# Patient Record
Sex: FEMALE | URBAN - METROPOLITAN AREA
[De-identification: names, ages, dates, MRNs, and addresses within clinical notes are randomized per-mention and may not be internally consistent; named-entity substitution may affect disease eponyms.]

---

## 2023-07-24 ENCOUNTER — OFFICE VISIT (OUTPATIENT)
Dept: OBGYN CLINIC | Age: 38
End: 2023-07-24
Payer: OTHER GOVERNMENT

## 2023-07-24 ENCOUNTER — TELEPHONE (OUTPATIENT)
Dept: OBGYN CLINIC | Age: 38
End: 2023-07-24

## 2023-07-24 VITALS
WEIGHT: 168 LBS | DIASTOLIC BLOOD PRESSURE: 64 MMHG | SYSTOLIC BLOOD PRESSURE: 118 MMHG | BODY MASS INDEX: 24.88 KG/M2 | HEIGHT: 69 IN

## 2023-07-24 DIAGNOSIS — N92.0 MENORRHAGIA WITH REGULAR CYCLE: Primary | ICD-10-CM

## 2023-07-24 DIAGNOSIS — D50.0 CHRONIC BLOOD LOSS ANEMIA: ICD-10-CM

## 2023-07-24 DIAGNOSIS — D25.1 INTRAMURAL, SUBMUCOUS, AND SUBSEROUS LEIOMYOMA OF UTERUS: ICD-10-CM

## 2023-07-24 DIAGNOSIS — N80.129 ENDOMETRIOMA: ICD-10-CM

## 2023-07-24 DIAGNOSIS — N94.6 DYSMENORRHEA: ICD-10-CM

## 2023-07-24 DIAGNOSIS — D25.2 INTRAMURAL, SUBMUCOUS, AND SUBSEROUS LEIOMYOMA OF UTERUS: ICD-10-CM

## 2023-07-24 DIAGNOSIS — Z12.4 PAP SMEAR FOR CERVICAL CANCER SCREENING: ICD-10-CM

## 2023-07-24 DIAGNOSIS — D25.0 INTRAMURAL, SUBMUCOUS, AND SUBSEROUS LEIOMYOMA OF UTERUS: ICD-10-CM

## 2023-07-24 PROCEDURE — 99204 OFFICE O/P NEW MOD 45 MIN: CPT | Performed by: OBSTETRICS & GYNECOLOGY

## 2023-07-24 RX ORDER — OMEPRAZOLE 40 MG/1
CAPSULE, DELAYED RELEASE ORAL
COMMUNITY
Start: 2023-07-19

## 2023-07-24 RX ORDER — SERTRALINE HYDROCHLORIDE 25 MG/1
12.5 TABLET, FILM COATED ORAL DAILY
COMMUNITY

## 2023-07-24 ASSESSMENT — PATIENT HEALTH QUESTIONNAIRE - PHQ9
SUM OF ALL RESPONSES TO PHQ QUESTIONS 1-9: 1
2. FEELING DOWN, DEPRESSED OR HOPELESS: 0
SUM OF ALL RESPONSES TO PHQ QUESTIONS 1-9: 1
1. LITTLE INTEREST OR PLEASURE IN DOING THINGS: 1
SUM OF ALL RESPONSES TO PHQ9 QUESTIONS 1 & 2: 1

## 2023-07-24 NOTE — PROGRESS NOTES
Patient here for discussion of hysterectomy. Patient states her Kristie gave approval for hysterectomy. LAST PAP:  2021, in [facility] clinic in [place]. Patient denies abnormal pap smears. Sexually active: yes-same sex partner      LAST MAMMO:  never     LMP:  Patient's last menstrual period was 07/04/2023 (exact date).     BIRTH CONTROL:  none    TOBACCO USE:  No    FAMILY HISTORY OF:   Breast Cancer:  no   Ovarian Cancer:  No   Uterine Cancer:  No   Colon Cancer:  No    Vitals:    07/24/23 0848   BP: 118/64   Site: Right Upper Arm   Position: Sitting   Weight: 168 lb (76.2 kg)   Height: 5' 9\" (1.753 m)        Tiff Young RN  07/24/23  9:05 AM
Uterus: slightly enlarged by palpation C/W know fibroid    Msk:   no deformity or scoliosis noted with normal posture and gait     Skin:   intact without lesions or rashes     Psych:  alert and cooperative; normal mood and affect; normal attention span and concentration        Aaron Casillas MD   9:52 AM  07/24/23

## 2023-07-24 NOTE — TELEPHONE ENCOUNTER
Name: Torsten Harper     : 1985    Physicians Information    Recommended Surgery: Dx lap, poss left ovarian cystectomy, poss LSO, any other indicated procedures  CPT Code:   87910, 20345  Place:[facility]  When:  Diagnosis:   Diagnosis Orders   1. Menorrhagia with regular cycle        2. Endometrioma        3.  Dysmenorrhea          Time Needed:  Surgeon:Phoenix Fritz MD  Assistant Needed:  Special Request:    Surgery Department Information    Date Scheduled: _____________________ Hospital Pre-Op:______________________        Time Scheduled : ____________________ Surgery Entered: _____________________    Facility: ____________________________ Patient Notified: ______________________    BSO Pre-Op: _______________________ Orders Completed: ___________________

## 2023-07-24 NOTE — PATIENT INSTRUCTIONS
We will call you when your surgery is scheduled  You will get a phone call from the hospital to talk about your medical history and any other issues. They will tell you when and where to be that morning. Please do NOT eat or drink anything after midnight the night before you surgery  If anything changes or you have any questions before your procedure, please call us  I will see you the morning of your surgery!

## 2023-07-24 NOTE — ASSESSMENT & PLAN NOTE
All previous notes, labs and/or imaging performed by [facility] were reviewed and confirmed with pt today. I interpreted these results and D/W pt my opinion and recommendations accordingly. D/W pt findings and treatment options:  OCPs (pt refuses due to CA risk), Myfembree (to help possibly reduce fibroid/endometrioma burden), Sonata and hyst (D/W her concern for prob ERNIE due to fibroids/endometrioma)  After LENGTHY discussion, pt hesitant to try Myfembree due to poss SE's. She would like to proceed with Dx lap and tx of endo, poss LSO for initial treatment    PLAN:  Dx lap, poss left ovarian cystectomy, poss LSO, any other indicated procedures  D/W pt at length risks/benefits of procedure including but not limited to death, bleeding, infection, possible treatment failure and damage to other internal organs. She exhibited full understanding and wishes to proceed.

## 2023-07-29 LAB
CYTOLOGIST CVX/VAG CYTO: NORMAL
CYTOLOGY CVX/VAG DOC THIN PREP: NORMAL
HPV APTIMA: NEGATIVE
HPV GENOTYPE REFLEX: NORMAL
Lab: NORMAL
PATH REPORT.FINAL DX SPEC: NORMAL
STAT OF ADQ CVX/VAG CYTO-IMP: NORMAL

## 2023-08-02 ENCOUNTER — PREP FOR PROCEDURE (OUTPATIENT)
Dept: OBGYN CLINIC | Age: 38
End: 2023-08-02

## 2023-08-23 PROBLEM — Z12.4 PAP SMEAR FOR CERVICAL CANCER SCREENING: Status: RESOLVED | Noted: 2023-07-24 | Resolved: 2023-08-23

## 2023-11-08 ENCOUNTER — OFFICE VISIT (OUTPATIENT)
Dept: OBGYN CLINIC | Age: 38
End: 2023-11-08
Payer: OTHER GOVERNMENT

## 2023-11-08 VITALS
BODY MASS INDEX: 24.88 KG/M2 | SYSTOLIC BLOOD PRESSURE: 120 MMHG | DIASTOLIC BLOOD PRESSURE: 76 MMHG | WEIGHT: 168 LBS | HEIGHT: 69 IN

## 2023-11-08 DIAGNOSIS — D25.2 INTRAMURAL, SUBMUCOUS, AND SUBSEROUS LEIOMYOMA OF UTERUS: Primary | ICD-10-CM

## 2023-11-08 DIAGNOSIS — D25.1 INTRAMURAL, SUBMUCOUS, AND SUBSEROUS LEIOMYOMA OF UTERUS: Primary | ICD-10-CM

## 2023-11-08 DIAGNOSIS — N94.6 DYSMENORRHEA: ICD-10-CM

## 2023-11-08 DIAGNOSIS — N92.0 MENORRHAGIA WITH REGULAR CYCLE: ICD-10-CM

## 2023-11-08 DIAGNOSIS — N80.129 ENDOMETRIOMA: ICD-10-CM

## 2023-11-08 DIAGNOSIS — D25.0 INTRAMURAL, SUBMUCOUS, AND SUBSEROUS LEIOMYOMA OF UTERUS: Primary | ICD-10-CM

## 2023-11-08 PROCEDURE — 99214 OFFICE O/P EST MOD 30 MIN: CPT | Performed by: OBSTETRICS & GYNECOLOGY

## 2023-11-08 SDOH — ECONOMIC STABILITY: FOOD INSECURITY: WITHIN THE PAST 12 MONTHS, THE FOOD YOU BOUGHT JUST DIDN'T LAST AND YOU DIDN'T HAVE MONEY TO GET MORE.: NEVER TRUE

## 2023-11-08 SDOH — ECONOMIC STABILITY: HOUSING INSECURITY
IN THE LAST 12 MONTHS, WAS THERE A TIME WHEN YOU DID NOT HAVE A STEADY PLACE TO SLEEP OR SLEPT IN A SHELTER (INCLUDING NOW)?: NO

## 2023-11-08 SDOH — ECONOMIC STABILITY: INCOME INSECURITY: HOW HARD IS IT FOR YOU TO PAY FOR THE VERY BASICS LIKE FOOD, HOUSING, MEDICAL CARE, AND HEATING?: NOT HARD AT ALL

## 2023-11-08 SDOH — ECONOMIC STABILITY: FOOD INSECURITY: WITHIN THE PAST 12 MONTHS, YOU WORRIED THAT YOUR FOOD WOULD RUN OUT BEFORE YOU GOT MONEY TO BUY MORE.: NEVER TRUE

## 2023-11-08 NOTE — PATIENT INSTRUCTIONS
Your surgery is scheduled for November 15th. You will get a phone call from the hospital to talk about your medical history and any other issues. They will tell you when and where to be that morning. Please do NOT eat or drink anything after midnight the night before you surgery  If anything changes or you have any questions before your procedure, please call us  I will see you the morning of your surgery!

## 2023-11-08 NOTE — ASSESSMENT & PLAN NOTE
After LENGTHY discussion, pt hesitant to try Myfembree due to poss SE's. She would like to proceed with Dx lap and tx of endo, poss LSO for initial treatment    PLAN:  Dx lap, poss left ovarian cystectomy, poss LSO, any other indicated procedures  D/W pt at length risks/benefits of procedure including but not limited to death, bleeding, infection, possible treatment failure and damage to other internal organs.  She exhibited full understanding and wishes to proceed

## 2023-11-08 NOTE — PROGRESS NOTES
Patient here for pre-op appointment. DX Lap. scheduled for 11/15/23. LAST PAP:  7/24/2023, neg., HPV neg. LAST MAMMO:  never     LMP:  Patient's last menstrual period was 10/19/2023 (exact date).     BIRTH CONTROL:  none-same sex partner     TOBACCO USE:  No    FAMILY HISTORY OF:   Breast Cancer:  No   Ovarian Cancer:  No   Uterine Cancer:  No   Colon Cancer:  No    Vitals:    11/08/23 0859   BP: 136/82   Site: Left Upper Arm   Position: Supine   Weight: 76.2 kg (168 lb)   Height: 1.753 m (5' 9\")        Jerome Antonio RN  11/08/23  9:06 AM

## 2023-11-08 NOTE — PROGRESS NOTES
Junaid Arenas, [address], [address] [facility]  [phone]    Bethany Phipps MD, Megan Garcia, [facility]  Mark Holden MD, FACOG    Assessment/Plan     Patient Active Problem List    Diagnosis Date Noted    Dysmenorrhea 07/24/2023     Overview Note:     noted       Assessment & Plan Note:     See overview and A&P under fibroids      Intramural, submucous, and subserous leiomyoma of uterus 07/24/2023     Overview Note:     MRI from [place]:  Uterus 9.4 x 6.9 x 8.3 cm with anterior fibroid 6.0 x 5.3 cm abutting cavity, small fundal fibroid 2.2 cm. Multiple suspected endometriomas with left large one 7.2 x 5.4 cm, right one 2.1 x 2.1 cm  D/W pt findings and treatment options:  OCPs (pt refuses due to CA risk), Myfembree (to help possibly reduce fibroid/endometrioma burden), Sonata and hyst (D/W her concern for prob ERNIE due to fibroids/endometrioma)  After LENGTHY discussion, pt hesitant to try Myfembree due to poss SE's. She would like to proceed with Dx lap and tx of endo, poss LSO for initial treatment    PLAN:  Dx lap, poss left ovarian cystectomy, poss LSO, any other indicated procedures  D/W pt at length risks/benefits of procedure including but not limited to death, bleeding, infection, possible treatment failure and damage to other internal organs. She exhibited full understanding and wishes to proceed       Assessment & Plan Note:     After LENGTHY discussion, pt hesitant to try Myfembree due to poss SE's. She would like to proceed with Dx lap and tx of endo, poss LSO for initial treatment    PLAN:  Dx lap, poss left ovarian cystectomy, poss LSO, any other indicated procedures  D/W pt at length risks/benefits of procedure including but not limited to death, bleeding, infection, possible treatment failure and damage to other internal organs.  She exhibited full understanding and wishes to proceed        Menorrhagia with regular cycle 07/24/2023     Overview Note:     noted

## 2023-11-10 NOTE — PROGRESS NOTES
Patient verified name and . Order for consent not found in EHR; patient verifies procedure. Type 2 surgery, Phone assessment complete. Orders not received. Labs per surgeon: none  Labs per anesthesia protocol: none    HGB 7.7 on 10/19/23. Received Iron infusion 10/20/23 and 10/27/23. Will have anesthesia review chart. Patient answered medical/surgical history questions at their best of ability. All prior to admission medications documented in EPIC. Patient instructed to take the following medications the day of surgery according to anesthesia guidelines with a small sip of water: Prilosec, Zoloft. Hold all vitamins 7 days prior to surgery and NSAIDS 5 days prior to surgery. Prescription meds to hold:none  Patient instructed on the following:    > Arrive at [address] [place] [address], time of arrival to be called the day before by 1700  > NPO after midnight, unless otherwise indicated, including gum, mints, and ice chips  > Responsible adult must drive patient to the hospital, stay during surgery, and patient will need supervision 24 hours after anesthesia  > Use antibacterial soap in shower the night before surgery and on the morning of surgery  > All piercings must be removed prior to arrival.    > Leave all valuables (money and jewelry) at home but bring insurance card and ID on DOS.   > Do not wear make-up, nail polish, lotions, cologne, perfumes, powders, or oil on skin. Artificial nails are not permitted.

## 2023-11-10 NOTE — H&P
nausea/vomiting/diarrhea/constipation    Neuro: No HA, no seizure like activity    Skin: No rashes or lesions     : No dysuria or hematuria        Objective    Vitals:    11/10/23 0922   Weight: 76.2 kg (168 lb)   Height: 1.753 m (5' 9\")          Physical Exam    Gen: alert and cooperative, NAD    HEENT: NCAT    CV: RRR    Resp: CTA bilat    Abd: soft, NT, NABS    EXT: trace edema bilat    Gyn: done as per prev office visit    Neuro: No focal deficits    Skin: No noted rashes or lesions       Bethany Phipps MD  9:38 AM  11/10/23

## 2023-11-13 NOTE — PROGRESS NOTES
Dr. Deyanira Banks reviewed chart. New order received \"ask Dr. Demarcus Snider if he wants repeat before DOS. \" [address] [facility] to proceed. Message sent to Dr. Demarcus Snider.

## 2023-11-14 ENCOUNTER — HOSPITAL ENCOUNTER (OUTPATIENT)
Dept: LAB | Age: 38
Discharge: HOME | End: 2023-11-17
Payer: OTHER GOVERNMENT

## 2023-11-14 ENCOUNTER — ANESTHESIA EVENT (OUTPATIENT)
Dept: SURGERY | Age: 38
End: 2023-11-14
Payer: OTHER GOVERNMENT

## 2023-11-14 DIAGNOSIS — Z01.818 PRE-OP TESTING: ICD-10-CM

## 2023-11-14 LAB — HGB BLD-MCNC: 11.7 G/DL (ref 11.7–15.4)

## 2023-11-14 PROCEDURE — 36415 COLL VENOUS BLD VENIPUNCTURE: CPT

## 2023-11-14 PROCEDURE — 85018 HEMOGLOBIN: CPT

## 2023-11-15 ENCOUNTER — HOSPITAL ENCOUNTER (OUTPATIENT)
Age: 38
Setting detail: OUTPATIENT SURGERY
Discharge: HOME | End: 2023-11-15
Attending: OBSTETRICS & GYNECOLOGY | Admitting: OBSTETRICS & GYNECOLOGY
Payer: OTHER GOVERNMENT

## 2023-11-15 ENCOUNTER — ANESTHESIA (OUTPATIENT)
Dept: SURGERY | Age: 38
End: 2023-11-15
Payer: OTHER GOVERNMENT

## 2023-11-15 VITALS
RESPIRATION RATE: 18 BRPM | SYSTOLIC BLOOD PRESSURE: 126 MMHG | HEART RATE: 60 BPM | OXYGEN SATURATION: 100 % | DIASTOLIC BLOOD PRESSURE: 72 MMHG | WEIGHT: 168.2 LBS | BODY MASS INDEX: 24.91 KG/M2 | TEMPERATURE: 98 F | HEIGHT: 69 IN

## 2023-11-15 DIAGNOSIS — N80.129 ENDOMETRIOMA: ICD-10-CM

## 2023-11-15 DIAGNOSIS — Z01.818 PRE-OP TESTING: Primary | ICD-10-CM

## 2023-11-15 DIAGNOSIS — D25.2 INTRAMURAL, SUBMUCOUS, AND SUBSEROUS LEIOMYOMA OF UTERUS: ICD-10-CM

## 2023-11-15 DIAGNOSIS — D25.0 INTRAMURAL, SUBMUCOUS, AND SUBSEROUS LEIOMYOMA OF UTERUS: ICD-10-CM

## 2023-11-15 DIAGNOSIS — N92.0 MENORRHAGIA WITH REGULAR CYCLE: ICD-10-CM

## 2023-11-15 DIAGNOSIS — D25.1 INTRAMURAL, SUBMUCOUS, AND SUBSEROUS LEIOMYOMA OF UTERUS: ICD-10-CM

## 2023-11-15 LAB — HCG UR QL: NEGATIVE

## 2023-11-15 PROCEDURE — 6360000002 HC RX W HCPCS: Performed by: ANESTHESIOLOGY

## 2023-11-15 PROCEDURE — 6360000002 HC RX W HCPCS: Performed by: REGISTERED NURSE

## 2023-11-15 PROCEDURE — 7100000000 HC PACU RECOVERY - FIRST 15 MIN: Performed by: OBSTETRICS & GYNECOLOGY

## 2023-11-15 PROCEDURE — 2580000003 HC RX 258: Performed by: ANESTHESIOLOGY

## 2023-11-15 PROCEDURE — 7100000010 HC PHASE II RECOVERY - FIRST 15 MIN: Performed by: OBSTETRICS & GYNECOLOGY

## 2023-11-15 PROCEDURE — 7100000011 HC PHASE II RECOVERY - ADDTL 15 MIN: Performed by: OBSTETRICS & GYNECOLOGY

## 2023-11-15 PROCEDURE — 3600000004 HC SURGERY LEVEL 4 BASE: Performed by: OBSTETRICS & GYNECOLOGY

## 2023-11-15 PROCEDURE — 2500000003 HC RX 250 WO HCPCS: Performed by: ANESTHESIOLOGY

## 2023-11-15 PROCEDURE — 6370000000 HC RX 637 (ALT 250 FOR IP): Performed by: ANESTHESIOLOGY

## 2023-11-15 PROCEDURE — 3700000000 HC ANESTHESIA ATTENDED CARE: Performed by: OBSTETRICS & GYNECOLOGY

## 2023-11-15 PROCEDURE — 2709999900 HC NON-CHARGEABLE SUPPLY: Performed by: OBSTETRICS & GYNECOLOGY

## 2023-11-15 PROCEDURE — 2500000003 HC RX 250 WO HCPCS: Performed by: NURSE ANESTHETIST, CERTIFIED REGISTERED

## 2023-11-15 PROCEDURE — 6360000002 HC RX W HCPCS: Performed by: OBSTETRICS & GYNECOLOGY

## 2023-11-15 PROCEDURE — 3700000001 HC ADD 15 MINUTES (ANESTHESIA): Performed by: OBSTETRICS & GYNECOLOGY

## 2023-11-15 PROCEDURE — 3600000014 HC SURGERY LEVEL 4 ADDTL 15MIN: Performed by: OBSTETRICS & GYNECOLOGY

## 2023-11-15 PROCEDURE — 6360000002 HC RX W HCPCS: Performed by: NURSE ANESTHETIST, CERTIFIED REGISTERED

## 2023-11-15 PROCEDURE — 81025 URINE PREGNANCY TEST: CPT

## 2023-11-15 PROCEDURE — 7100000001 HC PACU RECOVERY - ADDTL 15 MIN: Performed by: OBSTETRICS & GYNECOLOGY

## 2023-11-15 RX ORDER — IBUPROFEN 600 MG/1
600 TABLET ORAL EVERY 6 HOURS PRN
Qty: 60 TABLET | Refills: 1 | Status: SHIPPED | OUTPATIENT
Start: 2023-11-15

## 2023-11-15 RX ORDER — ROCURONIUM BROMIDE 10 MG/ML
INJECTION, SOLUTION INTRAVENOUS PRN
Status: DISCONTINUED | OUTPATIENT
Start: 2023-11-15 | End: 2023-11-15 | Stop reason: SDUPTHER

## 2023-11-15 RX ORDER — OXYCODONE HYDROCHLORIDE AND ACETAMINOPHEN 5; 325 MG/1; MG/1
1 TABLET ORAL EVERY 6 HOURS PRN
Qty: 20 TABLET | Refills: 0 | Status: SHIPPED | OUTPATIENT
Start: 2023-11-15 | End: 2023-11-22

## 2023-11-15 RX ORDER — DEXAMETHASONE SODIUM PHOSPHATE 10 MG/ML
INJECTION INTRAMUSCULAR; INTRAVENOUS PRN
Status: DISCONTINUED | OUTPATIENT
Start: 2023-11-15 | End: 2023-11-15 | Stop reason: SDUPTHER

## 2023-11-15 RX ORDER — SODIUM CHLORIDE 9 MG/ML
INJECTION, SOLUTION INTRAVENOUS PRN
Status: DISCONTINUED | OUTPATIENT
Start: 2023-11-15 | End: 2023-11-15 | Stop reason: HOSPADM

## 2023-11-15 RX ORDER — FENTANYL CITRATE 50 UG/ML
25 INJECTION, SOLUTION INTRAMUSCULAR; INTRAVENOUS
Status: DISCONTINUED | OUTPATIENT
Start: 2023-11-15 | End: 2023-11-15 | Stop reason: HOSPADM

## 2023-11-15 RX ORDER — KETOROLAC TROMETHAMINE 30 MG/ML
INJECTION, SOLUTION INTRAMUSCULAR; INTRAVENOUS PRN
Status: DISCONTINUED | OUTPATIENT
Start: 2023-11-15 | End: 2023-11-15 | Stop reason: SDUPTHER

## 2023-11-15 RX ORDER — PROPOFOL 10 MG/ML
INJECTION, EMULSION INTRAVENOUS PRN
Status: DISCONTINUED | OUTPATIENT
Start: 2023-11-15 | End: 2023-11-15 | Stop reason: SDUPTHER

## 2023-11-15 RX ORDER — OXYCODONE HYDROCHLORIDE 5 MG/1
5 TABLET ORAL PRN
Status: COMPLETED | OUTPATIENT
Start: 2023-11-15 | End: 2023-11-15

## 2023-11-15 RX ORDER — SODIUM CHLORIDE 9 MG/ML
INJECTION, SOLUTION INTRAVENOUS CONTINUOUS
Status: DISCONTINUED | OUTPATIENT
Start: 2023-11-15 | End: 2023-11-15 | Stop reason: HOSPADM

## 2023-11-15 RX ORDER — SODIUM CHLORIDE 0.9 % (FLUSH) 0.9 %
5-40 SYRINGE (ML) INJECTION PRN
Status: DISCONTINUED | OUTPATIENT
Start: 2023-11-15 | End: 2023-11-15 | Stop reason: HOSPADM

## 2023-11-15 RX ORDER — SODIUM CHLORIDE 0.9 % (FLUSH) 0.9 %
5-40 SYRINGE (ML) INJECTION EVERY 12 HOURS SCHEDULED
Status: DISCONTINUED | OUTPATIENT
Start: 2023-11-15 | End: 2023-11-15 | Stop reason: HOSPADM

## 2023-11-15 RX ORDER — GLYCOPYRROLATE 0.2 MG/ML
INJECTION INTRAMUSCULAR; INTRAVENOUS PRN
Status: DISCONTINUED | OUTPATIENT
Start: 2023-11-15 | End: 2023-11-15 | Stop reason: SDUPTHER

## 2023-11-15 RX ORDER — MIDAZOLAM HYDROCHLORIDE 1 MG/ML
INJECTION INTRAMUSCULAR; INTRAVENOUS PRN
Status: DISCONTINUED | OUTPATIENT
Start: 2023-11-15 | End: 2023-11-15 | Stop reason: SDUPTHER

## 2023-11-15 RX ORDER — BUPIVACAINE HYDROCHLORIDE 5 MG/ML
INJECTION, SOLUTION EPIDURAL; INTRACAUDAL PRN
Status: DISCONTINUED | OUTPATIENT
Start: 2023-11-15 | End: 2023-11-15 | Stop reason: ALTCHOICE

## 2023-11-15 RX ORDER — FENTANYL CITRATE 50 UG/ML
100 INJECTION, SOLUTION INTRAMUSCULAR; INTRAVENOUS
Status: DISCONTINUED | OUTPATIENT
Start: 2023-11-15 | End: 2023-11-15 | Stop reason: HOSPADM

## 2023-11-15 RX ORDER — SODIUM CHLORIDE 0.9 % (FLUSH) 0.9 %
5-40 SYRINGE (ML) INJECTION PRN
Status: DISCONTINUED | OUTPATIENT
Start: 2023-11-15 | End: 2023-11-15

## 2023-11-15 RX ORDER — OXYCODONE HYDROCHLORIDE 5 MG/1
10 TABLET ORAL PRN
Status: COMPLETED | OUTPATIENT
Start: 2023-11-15 | End: 2023-11-15

## 2023-11-15 RX ORDER — APREPITANT 40 MG/1
40 CAPSULE ORAL ONCE
Status: COMPLETED | OUTPATIENT
Start: 2023-11-15 | End: 2023-11-15

## 2023-11-15 RX ORDER — SODIUM CHLORIDE 9 MG/ML
INJECTION, SOLUTION INTRAVENOUS PRN
Status: DISCONTINUED | OUTPATIENT
Start: 2023-11-15 | End: 2023-11-15

## 2023-11-15 RX ORDER — NEOSTIGMINE METHYLSULFATE 1 MG/ML
INJECTION, SOLUTION INTRAVENOUS PRN
Status: DISCONTINUED | OUTPATIENT
Start: 2023-11-15 | End: 2023-11-15 | Stop reason: SDUPTHER

## 2023-11-15 RX ORDER — ONDANSETRON 2 MG/ML
INJECTION INTRAMUSCULAR; INTRAVENOUS PRN
Status: DISCONTINUED | OUTPATIENT
Start: 2023-11-15 | End: 2023-11-15 | Stop reason: SDUPTHER

## 2023-11-15 RX ORDER — LIDOCAINE HYDROCHLORIDE 20 MG/ML
INJECTION, SOLUTION EPIDURAL; INFILTRATION; INTRACAUDAL; PERINEURAL PRN
Status: DISCONTINUED | OUTPATIENT
Start: 2023-11-15 | End: 2023-11-15 | Stop reason: SDUPTHER

## 2023-11-15 RX ORDER — SCOLOPAMINE TRANSDERMAL SYSTEM 1 MG/1
1 PATCH, EXTENDED RELEASE TRANSDERMAL ONCE
Status: DISCONTINUED | OUTPATIENT
Start: 2023-11-15 | End: 2023-11-15 | Stop reason: HOSPADM

## 2023-11-15 RX ORDER — SODIUM CHLORIDE, SODIUM LACTATE, POTASSIUM CHLORIDE, CALCIUM CHLORIDE 600; 310; 30; 20 MG/100ML; MG/100ML; MG/100ML; MG/100ML
INJECTION, SOLUTION INTRAVENOUS CONTINUOUS
Status: DISCONTINUED | OUTPATIENT
Start: 2023-11-15 | End: 2023-11-15 | Stop reason: HOSPADM

## 2023-11-15 RX ORDER — ONDANSETRON 2 MG/ML
4 INJECTION INTRAMUSCULAR; INTRAVENOUS
Status: COMPLETED | OUTPATIENT
Start: 2023-11-15 | End: 2023-11-15

## 2023-11-15 RX ORDER — FENTANYL CITRATE 50 UG/ML
INJECTION, SOLUTION INTRAMUSCULAR; INTRAVENOUS PRN
Status: DISCONTINUED | OUTPATIENT
Start: 2023-11-15 | End: 2023-11-15 | Stop reason: SDUPTHER

## 2023-11-15 RX ORDER — ONDANSETRON 4 MG/1
4 TABLET, ORALLY DISINTEGRATING ORAL EVERY 6 HOURS PRN
Qty: 20 TABLET | Refills: 0 | Status: SHIPPED | OUTPATIENT
Start: 2023-11-15

## 2023-11-15 RX ORDER — DIPHENHYDRAMINE HYDROCHLORIDE 50 MG/ML
6.25 INJECTION INTRAMUSCULAR; INTRAVENOUS
Status: DISCONTINUED | OUTPATIENT
Start: 2023-11-15 | End: 2023-11-15 | Stop reason: HOSPADM

## 2023-11-15 RX ORDER — HYDROCODONE BITARTRATE AND ACETAMINOPHEN 5; 325 MG/1; MG/1
1 TABLET ORAL EVERY 6 HOURS PRN
Qty: 18 TABLET | Refills: 0 | Status: SHIPPED | OUTPATIENT
Start: 2023-11-15 | End: 2023-11-20

## 2023-11-15 RX ORDER — MIDAZOLAM HYDROCHLORIDE 2 MG/2ML
2 INJECTION, SOLUTION INTRAMUSCULAR; INTRAVENOUS
Status: DISCONTINUED | OUTPATIENT
Start: 2023-11-15 | End: 2023-11-15 | Stop reason: HOSPADM

## 2023-11-15 RX ORDER — LIDOCAINE HYDROCHLORIDE 10 MG/ML
1 INJECTION, SOLUTION INFILTRATION; PERINEURAL
Status: COMPLETED | OUTPATIENT
Start: 2023-11-15 | End: 2023-11-15

## 2023-11-15 RX ORDER — SODIUM CHLORIDE 0.9 % (FLUSH) 0.9 %
5-40 SYRINGE (ML) INJECTION EVERY 12 HOURS SCHEDULED
Status: DISCONTINUED | OUTPATIENT
Start: 2023-11-15 | End: 2023-11-15

## 2023-11-15 RX ADMIN — HYDROMORPHONE HYDROCHLORIDE 0.5 MG: 1 INJECTION, SOLUTION INTRAMUSCULAR; INTRAVENOUS; SUBCUTANEOUS at 12:34

## 2023-11-15 RX ADMIN — GLYCOPYRROLATE 0.5 MG: 0.2 INJECTION INTRAMUSCULAR; INTRAVENOUS at 12:02

## 2023-11-15 RX ADMIN — PHENYLEPHRINE HYDROCHLORIDE 100 MCG: 0.1 INJECTION, SOLUTION INTRAVENOUS at 11:54

## 2023-11-15 RX ADMIN — ROCURONIUM BROMIDE 40 MG: 10 INJECTION, SOLUTION INTRAVENOUS at 11:16

## 2023-11-15 RX ADMIN — KETOROLAC TROMETHAMINE 30 MG: 30 INJECTION, SOLUTION INTRAMUSCULAR at 12:04

## 2023-11-15 RX ADMIN — PROPOFOL 200 MG: 10 INJECTION, EMULSION INTRAVENOUS at 11:16

## 2023-11-15 RX ADMIN — HYDROMORPHONE HYDROCHLORIDE 0.5 MG: 1 INJECTION, SOLUTION INTRAMUSCULAR; INTRAVENOUS; SUBCUTANEOUS at 12:29

## 2023-11-15 RX ADMIN — LIDOCAINE HYDROCHLORIDE 1 ML: 10 INJECTION, SOLUTION INFILTRATION; PERINEURAL at 09:48

## 2023-11-15 RX ADMIN — GLYCOPYRROLATE 0.1 MG: 0.2 INJECTION INTRAMUSCULAR; INTRAVENOUS at 11:29

## 2023-11-15 RX ADMIN — LIDOCAINE HYDROCHLORIDE 80 MG: 20 INJECTION, SOLUTION EPIDURAL; INFILTRATION; INTRACAUDAL; PERINEURAL at 11:15

## 2023-11-15 RX ADMIN — SODIUM CHLORIDE, SODIUM LACTATE, POTASSIUM CHLORIDE, AND CALCIUM CHLORIDE: 600; 310; 30; 20 INJECTION, SOLUTION INTRAVENOUS at 11:31

## 2023-11-15 RX ADMIN — MIDAZOLAM 2 MG: 1 INJECTION INTRAMUSCULAR; INTRAVENOUS at 11:05

## 2023-11-15 RX ADMIN — SODIUM CHLORIDE, SODIUM LACTATE, POTASSIUM CHLORIDE, AND CALCIUM CHLORIDE: 600; 310; 30; 20 INJECTION, SOLUTION INTRAVENOUS at 09:47

## 2023-11-15 RX ADMIN — HYDROMORPHONE HYDROCHLORIDE 0.5 MG: 1 INJECTION, SOLUTION INTRAMUSCULAR; INTRAVENOUS; SUBCUTANEOUS at 12:24

## 2023-11-15 RX ADMIN — ONDANSETRON 4 MG: 2 INJECTION INTRAMUSCULAR; INTRAVENOUS at 12:28

## 2023-11-15 RX ADMIN — Medication 5 MG: at 12:02

## 2023-11-15 RX ADMIN — OXYCODONE HYDROCHLORIDE 10 MG: 5 TABLET ORAL at 12:31

## 2023-11-15 RX ADMIN — ONDANSETRON 4 MG: 2 INJECTION INTRAMUSCULAR; INTRAVENOUS at 11:27

## 2023-11-15 RX ADMIN — DEXAMETHASONE SODIUM PHOSPHATE 7 MG: 10 INJECTION INTRAMUSCULAR; INTRAVENOUS at 11:27

## 2023-11-15 RX ADMIN — FENTANYL CITRATE 100 MCG: 50 INJECTION, SOLUTION INTRAMUSCULAR; INTRAVENOUS at 11:15

## 2023-11-15 RX ADMIN — APREPITANT 40 MG: 40 CAPSULE ORAL at 10:57

## 2023-11-15 ASSESSMENT — PAIN - FUNCTIONAL ASSESSMENT: PAIN_FUNCTIONAL_ASSESSMENT: NONE - DENIES PAIN

## 2023-11-15 ASSESSMENT — PAIN SCALES - GENERAL
PAINLEVEL_OUTOF10: 8
PAINLEVEL_OUTOF10: 10
PAINLEVEL_OUTOF10: 8
PAINLEVEL_OUTOF10: 5

## 2023-11-15 NOTE — INTERVAL H&P NOTE
I have examined and spoken with the patient this morning. She has no new medical issues or complaints. She reports no new medicines since H&P. She again understands procedure, risks/benefits and agrees to proceed.   Barbera Schlatter, MD  10:45 AM  11/15/23

## 2023-11-15 NOTE — ANESTHESIA POSTPROCEDURE EVALUATION
Department of Anesthesiology  Postprocedure Note    Patient: Vik Hicks  MRN: [id number]  YOB: 1985  Date of evaluation: 11/15/2023      Procedure Summary     Date: 11/15/23 Room / Location: [facility]  / [facility]    Anesthesia Start: 1100 Anesthesia Stop: 1224    Procedures:       LAPAROSCOPY DIAGNOSTIC (Abdomen)      DIAGNOSTIC LAPAROSCOPY, DRAINAGE LEFT OVARIAN ENDOMETRIOMA (Left) Diagnosis:       Menorrhagia with regular cycle      Endometrioma      Dysmenorrhea      (Menorrhagia with regular cycle [N92.0])      (Endometrioma [N80.129])      (Dysmenorrhea [N94.6])    Surgeons: Elzbieta Heart MD Responsible Provider: Chhaya Brsyon MD    Anesthesia Type: General ASA Status: 2          Anesthesia Type: General    Jennifer Phase I: Jennifer Score: 10    Jennifer Phase II: Jennifer Score: 10      Anesthesia Post Evaluation    Patient location during evaluation: PACU  Patient participation: complete - patient participated  Level of consciousness: awake  Airway patency: patent  Nausea & Vomiting: no nausea  Complications: no  Cardiovascular status: blood pressure returned to baseline and hemodynamically stable  Respiratory status: acceptable  Hydration status: stable  Multimodal analgesia pain management approach  Pain management: adequate

## 2023-11-15 NOTE — OP NOTE
Junaid Arenas, [address], [address] [place] [address], MD, Gabi Alfonso, WHNP-BC  Letty Swanson MD, FACOG    Yobanyreddleandra Yue Gastonmike  1985      Preop Dx:   1. Menorrhagia with regular    2. Dysmenorrhea    3. Left endometrioma    Postop Dx:   Same     4. Uterine adenomyosis    5. Pelvic peritoneal    Procedure:   1. Diagnostic laparoscopy    2. Laparoscopic drainage of left endometrioma    Surgeon:  Shivam Prasad      Anesthesia:  GETA    EBL:  5 mL     IVF:  1200 mL     UOP:  293 mL    Complications:  None    Findings:  Enlarged uterine fundus C/W known fibroid    Procedure:  Patient was taken to the operating room where GETA anesthesia was found to be adequate. She was prepped and draped in the usual sterile fashion and placed in the dorsal lithotomy position in the [employer] [facility] A weighted speculum was placed in the patient's vagina. Vagina was constricted and cervix stenotic. Sponge stick was placed in vagina. Attention was then turned to the patient's abdomen. After local infiltration with 0.5% marcaine, an infraumbilical skin incision was made with the scalpel. Veress needle was introduced. Intraabdominal placement confirmed with drop in pressure. Pneumopretioneum was then obtained with insufflation of CO2 gas. Bladeless 5 mm trocar was placed intraabdominally under direct visualization with the laparoscope. Survey of the patient's pelvis and abdomen revealed the above findings. A second and third bladeless 5 mm trocars were placed in the LLQ and RLQ under direct visualization with the laparoscope. Findings as above. Left ovary was freed from the pelvic sidewall bluntly. Small opening was made in the left ovary and copious chocolate fluid was released. Pelvis copiously irrigated, cleared of all clots and debris and hemostasis assured throughout. All instruments removed from the patient's abdomen.  The trocar sites were closed with 4-0 monocryl

## 2023-11-15 NOTE — ANESTHESIA PRE PROCEDURE
\"BEART\", \"[id number]\"     Type & Screen (If Applicable):  No results found for: \"LABABO\", \"LABRH\"    Drug/Infectious Status (If Applicable):  No results found for: \"HIV\", \"HEPCAB\"    COVID-19 Screening (If Applicable): No results found for: \"COVID19\"        Anesthesia Evaluation  Patient summary reviewed  Airway: Mallampati: II  TM distance: >3 FB   Neck ROM: full  Mouth opening: > = 3 FB   Dental: normal exam         Pulmonary:normal exam                               Cardiovascular:                      Neuro/Psych:               GI/Hepatic/Renal:             Endo/Other:                     Abdominal:             Vascular: Other Findings:           Anesthesia Plan      general     ASA 2             Anesthetic plan and risks discussed with patient.                         Elvira Egan MD   11/15/2023

## 2023-12-05 ENCOUNTER — OFFICE VISIT (OUTPATIENT)
Dept: OBGYN CLINIC | Age: 38
End: 2023-12-05

## 2023-12-05 ENCOUNTER — TELEPHONE (OUTPATIENT)
Dept: OBGYN CLINIC | Age: 38
End: 2023-12-05

## 2023-12-05 VITALS
SYSTOLIC BLOOD PRESSURE: 120 MMHG | DIASTOLIC BLOOD PRESSURE: 68 MMHG | WEIGHT: 168 LBS | BODY MASS INDEX: 24.88 KG/M2 | HEIGHT: 69 IN

## 2023-12-05 DIAGNOSIS — D25.1 INTRAMURAL, SUBMUCOUS, AND SUBSEROUS LEIOMYOMA OF UTERUS: ICD-10-CM

## 2023-12-05 DIAGNOSIS — N92.0 MENORRHAGIA WITH REGULAR CYCLE: ICD-10-CM

## 2023-12-05 DIAGNOSIS — D25.0 INTRAMURAL, SUBMUCOUS, AND SUBSEROUS LEIOMYOMA OF UTERUS: ICD-10-CM

## 2023-12-05 DIAGNOSIS — D25.2 INTRAMURAL, SUBMUCOUS, AND SUBSEROUS LEIOMYOMA OF UTERUS: ICD-10-CM

## 2023-12-05 DIAGNOSIS — Z09 POSTOPERATIVE EXAMINATION: Primary | ICD-10-CM

## 2023-12-05 DIAGNOSIS — N94.6 DYSMENORRHEA: ICD-10-CM

## 2023-12-05 DIAGNOSIS — N80.9 ENDOMETRIOSIS DETERMINED BY LAPAROSCOPY: ICD-10-CM

## 2023-12-05 PROCEDURE — 99024 POSTOP FOLLOW-UP VISIT: CPT | Performed by: OBSTETRICS & GYNECOLOGY

## 2023-12-05 RX ORDER — RELUGOLIX, ESTRADIOL HEMIHYDRATE, AND NORETHINDRONE ACETATE 40; 1; .5 MG/1; MG/1; MG/1
1 TABLET, FILM COATED ORAL DAILY
Qty: 30 TABLET | Refills: 11 | Status: SHIPPED | OUTPATIENT
Start: 2023-12-05

## 2023-12-05 NOTE — PROGRESS NOTES
Junaid Arenas, [address], [address], MD, Figueroa Schaefer, [facility]  Janeen Bryson MD, FACOG    2 week Post-Op Note    Ms. Carlos Galindo presents today for 2 week postop check after Dx lap, TOMMY, drainage of left endometrioma    She is without complaints today. Denies any F/C, CP, SOB. Vitals:    12/05/23 1336   BP: 120/68   Site: Left Upper Arm   Position: Sitting   Weight: 76.2 kg (168 lb)   Height: 1.753 m (5' 9\")          CV - RRR    Lungs - CTA bilat    ABD - soft, approp tend,  port incisions C/D/I      Problem List Items Addressed This Visit       Dysmenorrhea     See A&P under endometriosis         Intramural, submucous, and subserous leiomyoma of uterus    Menorrhagia with regular cycle     See A&P under endometriosis         Postoperative examination - Primary     Healed well          Endometriosis determined by laparoscopy     LENGTHY D/W pt about findings and treatment options. Pt ultimately interested in hysterectomy. D/W her that I recommend Myfembree at least for initial treatment as to decrease size of fibroid and decrease endometriosis burden (poss adhesive changes) to allow minimally invasive procedure [facility]TAIN SALEEM MADRIGAL [facility])  D/W pt that in light of endometriosis treament, studies have shown that hysterectomy without removal of ovaries portends a 50-60% chance of return of pelvic pain. Removal of ovaries at time of hysterectomy also has a pain recurrence of approximately 20-30%, along with other issues including but not limited to: difficulty in HRT, increase risk of osteoporosis and CAD. Also D/W pt that these procedures do NOT guarantee that pain will be resolved at all.    Pt agreed to try Myfembree - info given, Rx sent               Coty Nguyen MD   2:30 PM  12/05/23

## 2023-12-05 NOTE — PATIENT INSTRUCTIONS
Please start your mew medicine 3-5 days after your start your next period  Come back in 3 months for a recheck  Thanks for coming to see us today and letting us take care of you!

## 2023-12-05 NOTE — PROGRESS NOTES
Patient had a diagnostic laparoscopy, drainage left ovarian endometrioma on 11/15/2023. No complaints/concerns today. LAST PAP:  07/24/2023, Negative HPV Negative    LAST MAMMO:  Never    LMP:  Patient's last menstrual period was 11/16/2023 (exact date).     BIRTH CONTROL:  none    TOBACCO USE:  No    FAMILY HISTORY OF:   Breast Cancer:  No   Ovarian Cancer:  No   Uterine Cancer:  No   Colon Cancer:  No    Vitals:    12/05/23 1336   BP: 120/68   Site: Left Upper Arm   Position: Sitting   Weight: 76.2 kg (168 lb)   Height: 1.753 m (5' 9\")        Genoveva Cardenas MA  12/05/23  1:42 PM

## 2023-12-05 NOTE — ASSESSMENT & PLAN NOTE
LENGTHY D/W pt about findings and treatment options. Pt ultimately interested in hysterectomy. D/W her that I recommend Myfembree at least for initial treatment as to decrease size of fibroid and decrease endometriosis burden (poss adhesive changes) to allow minimally invasive procedure [facility] SALEEM ESPINO[facility])  D/W pt that in light of endometriosis treament, studies have shown that hysterectomy without removal of ovaries portends a 50-60% chance of return of pelvic pain. Removal of ovaries at time of hysterectomy also has a pain recurrence of approximately 20-30%, along with other issues including but not limited to: difficulty in HRT, increase risk of osteoporosis and CAD. Also D/W pt that these procedures do NOT guarantee that pain will be resolved at all.    Pt agreed to try Myfembree - info given, Rx sent

## 2023-12-05 NOTE — TELEPHONE ENCOUNTER
Pt lvm stating she had an appt today and her Myfembree was sent to [facility]. When she went to go pick it up they stated she needed a PA. Called pt back, informed pt that we are working to get her PA approved and will call her when it does get approved or if there are any issues. Pt voiced understanding.

## 2024-01-04 PROBLEM — Z09 POSTOPERATIVE EXAMINATION: Status: RESOLVED | Noted: 2023-12-05 | Resolved: 2024-01-04

## 2024-01-15 ENCOUNTER — TELEPHONE (OUTPATIENT)
Dept: OBGYN CLINIC | Age: 39
End: 2024-01-15

## 2024-01-15 NOTE — TELEPHONE ENCOUNTER
Patient called with concern of bleeding and clots since starting myfembree on 12/14/2023.   Patient states from 12/14/23-12/22/23 her bleeding was light and minimal and stopped from 12/22/23-12/26/2023. Bleeding re-started on 12/27/2023 which became heavier and more painful and has not stopped. Patient denies soaking through a pad or more in an hour or less, changes 3-4 super pads a day.     Patient states a pain rating of 7/10 even after taking ibuprofen, recommended urgent care or ER visit due to high pain rating and to be evaluated more in-depth.     Scheduled appointment per recommendation to f/u sooner for her myfembree rather than wait for the 3 mo f/u due to her bleeding worsening. Patient also requested sooner appointment as she would like to discuss surgical options rather than stay on myfembree medication as it has not helped her bleeding.     Appointment scheduled for 1/23/2024 @ 4pm.

## 2024-01-23 ENCOUNTER — TELEPHONE (OUTPATIENT)
Dept: OBGYN CLINIC | Age: 39
End: 2024-01-23

## 2024-01-23 ENCOUNTER — OFFICE VISIT (OUTPATIENT)
Dept: OBGYN CLINIC | Age: 39
End: 2024-01-23
Payer: OTHER GOVERNMENT

## 2024-01-23 VITALS
SYSTOLIC BLOOD PRESSURE: 124 MMHG | WEIGHT: 178 LBS | DIASTOLIC BLOOD PRESSURE: 66 MMHG | HEIGHT: 69 IN | BODY MASS INDEX: 26.36 KG/M2

## 2024-01-23 DIAGNOSIS — D25.2 INTRAMURAL, SUBMUCOUS, AND SUBSEROUS LEIOMYOMA OF UTERUS: ICD-10-CM

## 2024-01-23 DIAGNOSIS — N92.0 MENORRHAGIA WITH REGULAR CYCLE: Primary | ICD-10-CM

## 2024-01-23 DIAGNOSIS — D25.1 INTRAMURAL, SUBMUCOUS, AND SUBSEROUS LEIOMYOMA OF UTERUS: ICD-10-CM

## 2024-01-23 DIAGNOSIS — D25.0 INTRAMURAL, SUBMUCOUS, AND SUBSEROUS LEIOMYOMA OF UTERUS: ICD-10-CM

## 2024-01-23 DIAGNOSIS — N94.6 DYSMENORRHEA: ICD-10-CM

## 2024-01-23 DIAGNOSIS — N80.9 ENDOMETRIOSIS DETERMINED BY LAPAROSCOPY: ICD-10-CM

## 2024-01-23 DIAGNOSIS — D50.0 CHRONIC BLOOD LOSS ANEMIA: ICD-10-CM

## 2024-01-23 PROCEDURE — 99214 OFFICE O/P EST MOD 30 MIN: CPT | Performed by: OBSTETRICS & GYNECOLOGY

## 2024-01-23 RX ORDER — TRAZODONE HYDROCHLORIDE 50 MG/1
TABLET ORAL
COMMUNITY
Start: 2024-01-19

## 2024-01-23 RX ORDER — MEDROXYPROGESTERONE ACETATE 10 MG/1
20 TABLET ORAL 2 TIMES DAILY
Qty: 180 TABLET | Refills: 3 | Status: SHIPPED | OUTPATIENT
Start: 2024-01-23

## 2024-01-23 NOTE — ASSESSMENT & PLAN NOTE
Pt has failed outpt medical and surgical mgmt and desires more definitive treatment at this point    PLAN:  AYANA  D/W pt at length risks/benefits of procedure including but not limited to death, bleeding, infection, possible treatment failure and damage to other internal organs. She exhibited full understanding and wishes to proceed.

## 2024-01-23 NOTE — TELEPHONE ENCOUNTER
Name: Prateek Llamas     : 1985    Physicians Information    Recommended Surgery: [facility]-  CPT Code:   24937, s2900  Place:[facility]  When:  Diagnosis:     ICD-10-CM    1. Menorrhagia with regular cycle  N92.0       2. Endometriosis determined by laparoscopy  N80.9       3. Dysmenorrhea  N94.6          Time Needed:  Surgeon:Phoenix Bae MD  Assistant Needed:  Special Request:    Surgery Department Information    Date Scheduled: _____________________ Hospital Pre-Op:______________________        Time Scheduled : ____________________ Surgery Entered: _____________________    Facility: ____________________________ Patient Notified: ______________________    BSO Pre-Op: _______________________ Orders Completed: ___________________

## 2024-01-25 ENCOUNTER — PREP FOR PROCEDURE (OUTPATIENT)
Dept: OBGYN CLINIC | Age: 39
End: 2024-01-25

## 2024-01-25 DIAGNOSIS — N92.0 MENORRHAGIA WITH REGULAR CYCLE: Primary | ICD-10-CM

## 2024-01-25 NOTE — ASSESSMENT & PLAN NOTE
D/W pt that in light of endometriosis treament, studies have shown that hysterectomy without removal of ovaries portends a 50-60% chance of return of pelvic pain.  Removal of ovaries at time of hysterectomy also has a pain recurrence of approximately 20-30%, along with other issues including but not limited to: difficulty in HRT, increase risk of osteoporosis and CAD.  Also D/W pt that these procedures do NOT guarantee that pain will be resolved at all.

## 2024-01-25 NOTE — PROGRESS NOTES
Pt comes in today for follow up on MyFembree and surgical consult. Pt states she has been bleeding since starting the Myfembree, some days heavy but last 4 days have been lighter. Also states she has been passing a lot of blood clots. Pt states the bleeding is not as heavy as before starting the medication it is just continuous.     LAST PAP:  07/24/2023 negative negative     LAST MAMMO:  never     LMP:  No LMP recorded. (Menstrual status: Chemically Induced).    BIRTH CONTROL:  none    TOBACCO USE:  No    FAMILY HISTORY OF:   Breast Cancer:  No   Ovarian Cancer:  No   Uterine Cancer:  No   Colon Cancer:  No    Vitals:    01/23/24 1535   BP: 124/66   Site: Left Upper Arm   Position: Sitting   Weight: 80.7 kg (178 lb)   Height: 1.753 m (5' 9\")        Leisa Mclain MA  01/23/24  3:43 PM    
longer wants to cont meds at this point.  Denies any neuro changes, visual changes, H/A, CP, SOB.          OB History    Para Term  AB Living   0 0 0 0 0 0   SAB IAB Ectopic Molar Multiple Live Births   0 0 0 0 0 0           Past Medical History:   Diagnosis Date    Iron deficiency anemia due to chronic blood loss     Ovarian cyst     Uterine fibroid             Past Surgical History:   Procedure Laterality Date    COLONOSCOPY      LAPAROSCOPY N/A 11/15/2023    LAPAROSCOPY DIAGNOSTIC performed by Phoenix Bae MD at [facility]    SALPINGO-OOPHORECTOMY Left 11/15/2023    DIAGNOSTIC LAPAROSCOPY, DRAINAGE LEFT OVARIAN ENDOMETRIOMA performed by Phoenix Bae MD at [facility]    UPPER GASTROINTESTINAL ENDOSCOPY             Family History   Problem Relation Age of Onset    Breast Cancer Neg Hx     Colon Cancer Neg Hx     Uterine Cancer Neg Hx     Ovarian Cancer Neg Hx            Social History     Socioeconomic History    Marital status: Single     Spouse name: Not on file    Number of children: Not on file    Years of education: Not on file    Highest education level: Not on file   Occupational History    Not on file   Tobacco Use    Smoking status: Never    Smokeless tobacco: Never   Substance and Sexual Activity    Alcohol use: Yes     Comment: occ: 1-3 months    Drug use: Never    Sexual activity: Not on file   Other Topics Concern    Not on file   Social History Narrative    Abuse: Feels safe at home, no history of physical abuse, no history of sexual abuse      Social Determinants of Health     Financial Resource Strain: Low Risk  (2023)    Overall Financial Resource Strain (CARDIA)     Difficulty of Paying Living Expenses: Not hard at all   Food Insecurity: Not on file (2023)   Transportation Needs: Unknown (2023)    PRAPARE - Transportation     Lack of Transportation (Medical): Not on file     Lack of Transportation (Non-Medical): No   Physical Activity: Not

## 2024-02-01 NOTE — PROGRESS NOTES
Enhanced Recovery After GYN Surgery: non-diabetic patients    It is highly recommended you purchase and drink Ensure Complete - one bottle twice daily for five days starting on 02/22/24. Ensure Complete is the preferred formula over other Ensure formulas. It is recommended that you continue drinking this for one month after surgery.    The night before surgery 02/27/24, drink 2 bottles of the Ensure Pre-Surgery drink.     The morning of surgery 02/28/24, drink one bottle of the Ensure Pre-Surgery drink 2 hours prior to your arrival to the hospital. Drink this over 5-10 minutes.    Drink nothing else after drinking the pre-surgical drink the morning of surgery.    Bring your patient handbook with you to the hospital.    Things to remember:    1. You will be given clear liquids to drink, advancing diet as tolerated    2. You will be up and moving around with assistance 2-4 hours after surgery.    3. You will be given regularly scheduled pain medications (NSAIDS, Tylenol) with narcotics as needed.    4. You may be able to go home that night if the surgeon okays and you are up and eating and drinking. Otherwise, your discharge will be the following morning around lunch time.     5. Continue drinking Ensure Complete for 5 days after surgery.

## 2024-02-02 NOTE — PROGRESS NOTES
PLEASE CONTINUE TAKING ALL PRESCRIPTION MEDICATIONS UP TO THE DAY OF SURGERY UNLESS OTHERWISE DIRECTED BELOW. You may take Tylenol, allergy,  and/or indigestion medications.     TAKE ONLY THESE MEDICATIONS ON THE DAY OF SURGERY      Myfembree     Omeprazole     Sertraline     DISCONTINUE all vitamins and supplements 7 days prior to surgery. DISCONTINUE Non-Steroidal Anti-Inflammatory (NSAIDS) such as Advil and Aleve 5 days prior to surgery.     Home Medications to Hold- please continue all other medications except these.            Comments      On the day before surgery please take 2 Tylenol in the morning and then again before bed. You may use either regular or extra strength.           Please do not bring home medications with you on the day of surgery unless otherwise directed by your nurse.  If you are instructed to bring home medications, please give them to your nurse as they will be administered by the nursing staff.    If you have any questions, please call [facility] [phone].    A copy of this note was provided to the patient for reference.

## 2024-02-02 NOTE — PROGRESS NOTES
Patient verified name and     Order for consent not found in EHR; patient verified.     Type 2 surgery    Labs per surgeon: none; orders not received.  Labs per anesthesia protocol: hgb  EKG: not needed      Patient informed of GYN class on 24 at which time labs will be drawn. Patient will also receive all patient education and if staying overnight will receive hospital approved surgical skin cleanser; if not, patient will use non-moisturizing soap.    Patient instructed to hold all vitamins 7 days prior to surgery and NSAIDS 5 days prior to surgery, patient verbalized understanding.    Patient instructed to continue previous medications as prescribed prior to surgery and to take the following medications the day of surgery according to anesthesia guidelines with a small sip of water: Myfembree, Omeprazole, Sertraline.     Patient answered medical/surgical history questions at their best of ability. All prior to admission medications documented in [facility].

## 2024-02-07 ENCOUNTER — TELEPHONE (OUTPATIENT)
Dept: OBGYN CLINIC | Age: 39
End: 2024-02-07

## 2024-02-07 ENCOUNTER — HOSPITAL ENCOUNTER (OUTPATIENT)
Dept: SURGERY | Age: 39
Discharge: HOME | End: 2024-02-10
Payer: OTHER GOVERNMENT

## 2024-02-07 DIAGNOSIS — Z01.818 PRE-OP TESTING: ICD-10-CM

## 2024-02-07 LAB — HGB BLD-MCNC: 6.8 G/DL (ref 11.7–15.4)

## 2024-02-07 PROCEDURE — 85018 HEMOGLOBIN: CPT

## 2024-02-07 PROCEDURE — 36415 COLL VENOUS BLD VENIPUNCTURE: CPT

## 2024-02-07 NOTE — PROGRESS NOTES
Received call from Princess in Lab. Ryan'ts HGB resulted 6.8. Contacted patient who states this is her norm and she is currently in a 2 part Iron Transfusion with next dose due on Monday 02/12/24. Call placed to Dr. Paz - awaiting return call.

## 2024-02-07 NOTE — TELEPHONE ENCOUNTER
Called patient per conversation with provider Dr Bae to inform her of Low lab result. Advised that patient allow nurse to set up transfusion per MD recommendation per surgery protocol. Patient agreed, and verbalized understanding. Will discuss with Nurse on coordinating patient appt. Madonna Pearl.

## 2024-02-07 NOTE — NURSE NAVIGATOR
Patient attended [facility]/ GYN surgery orientation class today.  Detailed instruction book regarding GYN surgery was provided at start of class. Class content included pre-operative instructions for surgery in the week prior to and day before surgery. Packet including Hibiclens and printed instructions on bathing was provided to patient. Detailed and printed diet instruction and presurgical drinks were also given to patient  in accordance with ERAS protocol. Detailed information was given regarding arriving at the hospital and instructions for the patient's day of surgery.  Discussed recovery from surgery, hospital stay, pain management, and discharge.  Reviewed recovery at home including pelvic rest, driving and activity restrictions, issues requiring call to physician etc. Answered all questions in detail.  Patient voices understanding of all.

## 2024-02-07 NOTE — PROGRESS NOTES
Received call back from surgeon. States \"She needs blood transfusion, I'll have my office get this set up.\" Also notified Madonna in Surgeon's Office.

## 2024-02-09 ENCOUNTER — HOSPITAL ENCOUNTER (OUTPATIENT)
Dept: INFUSION THERAPY | Age: 39
Setting detail: INFUSION SERIES
Discharge: HOME | End: 2024-02-09
Payer: OTHER GOVERNMENT

## 2024-02-09 VITALS
HEART RATE: 72 BPM | RESPIRATION RATE: 16 BRPM | TEMPERATURE: 98.9 F | SYSTOLIC BLOOD PRESSURE: 134 MMHG | OXYGEN SATURATION: 100 % | DIASTOLIC BLOOD PRESSURE: 78 MMHG

## 2024-02-09 LAB — HISTORY CHECK: NORMAL

## 2024-02-09 PROCEDURE — 86901 BLOOD TYPING SEROLOGIC RH(D): CPT

## 2024-02-09 PROCEDURE — 6370000000 HC RX 637 (ALT 250 FOR IP): Performed by: NURSE PRACTITIONER

## 2024-02-09 PROCEDURE — 86850 RBC ANTIBODY SCREEN: CPT

## 2024-02-09 PROCEDURE — 86900 BLOOD TYPING SEROLOGIC ABO: CPT

## 2024-02-09 PROCEDURE — 86923 COMPATIBILITY TEST ELECTRIC: CPT

## 2024-02-09 PROCEDURE — P9016 RBC LEUKOCYTES REDUCED: HCPCS

## 2024-02-09 PROCEDURE — 2580000003 HC RX 258: Performed by: NURSE PRACTITIONER

## 2024-02-09 PROCEDURE — 36430 TRANSFUSION BLD/BLD COMPNT: CPT

## 2024-02-09 RX ORDER — ALBUTEROL SULFATE 90 UG/1
4 AEROSOL, METERED RESPIRATORY (INHALATION) PRN
Status: DISCONTINUED | OUTPATIENT
Start: 2024-02-09 | End: 2024-02-10 | Stop reason: HOSPADM

## 2024-02-09 RX ORDER — SODIUM CHLORIDE 9 MG/ML
100 INJECTION, SOLUTION INTRAVENOUS CONTINUOUS
Status: DISCONTINUED | OUTPATIENT
Start: 2024-02-09 | End: 2024-02-10 | Stop reason: HOSPADM

## 2024-02-09 RX ORDER — ONDANSETRON 2 MG/ML
8 INJECTION INTRAMUSCULAR; INTRAVENOUS
Status: DISCONTINUED | OUTPATIENT
Start: 2024-02-09 | End: 2024-02-10 | Stop reason: HOSPADM

## 2024-02-09 RX ORDER — SODIUM CHLORIDE 9 MG/ML
INJECTION, SOLUTION INTRAVENOUS PRN
Status: COMPLETED | OUTPATIENT
Start: 2024-02-09 | End: 2024-02-09

## 2024-02-09 RX ORDER — ACETAMINOPHEN 325 MG/1
650 TABLET ORAL
Status: DISCONTINUED | OUTPATIENT
Start: 2024-02-09 | End: 2024-02-10 | Stop reason: HOSPADM

## 2024-02-09 RX ORDER — DIPHENHYDRAMINE HCL 25 MG
25 CAPSULE ORAL SEE ADMIN INSTRUCTIONS
Status: DISCONTINUED | OUTPATIENT
Start: 2024-02-09 | End: 2024-02-10 | Stop reason: HOSPADM

## 2024-02-09 RX ORDER — EPINEPHRINE 1 MG/ML
0.3 INJECTION, SOLUTION, CONCENTRATE INTRAVENOUS PRN
Status: DISCONTINUED | OUTPATIENT
Start: 2024-02-09 | End: 2024-02-10 | Stop reason: HOSPADM

## 2024-02-09 RX ORDER — ACETAMINOPHEN 325 MG/1
650 TABLET ORAL SEE ADMIN INSTRUCTIONS
Status: COMPLETED | OUTPATIENT
Start: 2024-02-09 | End: 2024-02-09

## 2024-02-09 RX ORDER — SODIUM CHLORIDE 0.9 % (FLUSH) 0.9 %
5-40 SYRINGE (ML) INJECTION 2 TIMES DAILY
Status: DISCONTINUED | OUTPATIENT
Start: 2024-02-09 | End: 2024-02-10 | Stop reason: HOSPADM

## 2024-02-09 RX ORDER — DIPHENHYDRAMINE HYDROCHLORIDE 50 MG/ML
50 INJECTION INTRAMUSCULAR; INTRAVENOUS
Status: DISCONTINUED | OUTPATIENT
Start: 2024-02-09 | End: 2024-02-10 | Stop reason: HOSPADM

## 2024-02-09 RX ADMIN — ACETAMINOPHEN 650 MG: 325 TABLET ORAL at 13:09

## 2024-02-09 RX ADMIN — SODIUM CHLORIDE: 9 INJECTION, SOLUTION INTRAVENOUS at 12:48

## 2024-02-09 RX ADMIN — SODIUM CHLORIDE, PRESERVATIVE FREE 10 ML: 5 INJECTION INTRAVENOUS at 13:06

## 2024-02-09 NOTE — PROGRESS NOTES
Arrived to the Infusion Center. T&S completed and sent to the lab. Consent has been obtained. Patient is to return to infusion center later today when blood arrives.

## 2024-02-09 NOTE — PROGRESS NOTES
Arrived to the Infusion Center. 2 units of RBCs completed. Patient tolerated well.   Any issues or concerns during appointment: no.  Patient instructed to call provider with temperature of 100.4 or greater or nausea/vomiting/ diarrhea or pain not controlled by medications  Discharged ambulatory.

## 2024-02-10 LAB
ABO + RH BLD: NORMAL
BLD PROD TYP BPU: NORMAL
BLD PROD TYP BPU: NORMAL
BLOOD BANK DISPENSE STATUS: NORMAL
BLOOD BANK DISPENSE STATUS: NORMAL
BLOOD GROUP ANTIBODIES SERPL: NORMAL
BPU ID: NORMAL
BPU ID: NORMAL
CROSSMATCH RESULT: NORMAL
CROSSMATCH RESULT: NORMAL
SPECIMEN EXP DATE BLD: NORMAL
UNIT DIVISION: 0
UNIT DIVISION: 0

## 2024-02-22 ENCOUNTER — TELEPHONE (OUTPATIENT)
Dept: OBGYN CLINIC | Age: 39
End: 2024-02-22

## 2024-02-22 DIAGNOSIS — D50.0 CHRONIC BLOOD LOSS ANEMIA: Primary | ICD-10-CM

## 2024-02-22 RX ORDER — SODIUM CHLORIDE 0.9 % (FLUSH) 0.9 %
5-40 SYRINGE (ML) INJECTION EVERY 12 HOURS SCHEDULED
Status: CANCELLED | OUTPATIENT
Start: 2024-02-22

## 2024-02-22 RX ORDER — SODIUM CHLORIDE 0.9 % (FLUSH) 0.9 %
5-40 SYRINGE (ML) INJECTION PRN
Status: CANCELLED | OUTPATIENT
Start: 2024-02-22

## 2024-02-22 RX ORDER — SODIUM CHLORIDE 9 MG/ML
INJECTION, SOLUTION INTRAVENOUS PRN
Status: CANCELLED | OUTPATIENT
Start: 2024-02-22

## 2024-02-22 NOTE — H&P
[facility]  [address], [place] [address]  [phone]    Phoenix Bae MD, FACOG  Kena Myrick [facility]  Cheri Gaston MD, FACOG    [facility] H&P      Assessment/Plan    Patient Active Problem List    Diagnosis Date Noted    Endometriosis determined by laparoscopy 2023     Overview Note:     Noted on Dx lap 11/15/23      Dysmenorrhea 2023     Overview Note:     noted      Intramural, submucous, and subserous leiomyoma of uterus 2023     Overview Note:     [facility]  Uterus 9.4 x 6.9 x 8.3 cm with anterior fibroid 6.0 x 5.3 cm abutting cavity, small fundal fibroid 2.2 cm.  Multiple suspected endometriomas with left large one 7.2 x 5.4 cm, right one 2.1 x 2.1 cm      Menorrhagia with regular cycle 2023     Overview Note:     Pt has failed outpt medical and surgical mgmt and desires more definitive treatment at this point    PLAN:  DORENE-BS  D/W pt at length risks/benefits of procedure including but not limited to death, bleeding, infection, possible treatment failure and damage to other internal organs. She exhibited full understanding and wishes to proceed.        Endometrioma 2023     Overview Note:     noted      Chronic blood loss anemia 2023     Overview Note:     Fe infusions in [place]           Subjective    Prateek Gastonmike 38 y.o. presents today for surgical evaluation/treatment of the condition noted above.  She is without any new complaints or issues.    OB History    Para Term  AB Living   0 0 0 0 0 0   SAB IAB Ectopic Molar Multiple Live Births   0 0 0 0 0 0       Past Medical History:   Diagnosis Date    Iron deficiency anemia due to chronic blood loss     Ovarian cyst     Uterine fibroid        Past Surgical History:   Procedure Laterality Date    COLONOSCOPY      LAPAROSCOPY N/A 11/15/2023    LAPAROSCOPY DIAGNOSTIC [place] [facility] [place]    SALPINGO-OOPHORECTOMY Left

## 2024-02-22 NOTE — TELEPHONE ENCOUNTER
Patient informed of recommendations. Patient was scheduled for Monday for blood work. Order placed. Patient voiced understanding. [email or web address]

## 2024-02-22 NOTE — H&P (VIEW-ONLY)
[facility]  [address], [place] [address]  [phone]    Phoenix Bae MD, FACOG  Kena Myrick [facility]  Cheri Gaston MD, FACOG    [facility] H&P      Assessment/Plan    Patient Active Problem List    Diagnosis Date Noted    Endometriosis determined by laparoscopy 2023     Overview Note:     Noted on Dx lap 11/15/23      Dysmenorrhea 2023     Overview Note:     noted      Intramural, submucous, and subserous leiomyoma of uterus 2023     Overview Note:     [facility]  Uterus 9.4 x 6.9 x 8.3 cm with anterior fibroid 6.0 x 5.3 cm abutting cavity, small fundal fibroid 2.2 cm.  Multiple suspected endometriomas with left large one 7.2 x 5.4 cm, right one 2.1 x 2.1 cm      Menorrhagia with regular cycle 2023     Overview Note:     Pt has failed outpt medical and surgical mgmt and desires more definitive treatment at this point    PLAN:  DORENE-BS  D/W pt at length risks/benefits of procedure including but not limited to death, bleeding, infection, possible treatment failure and damage to other internal organs. She exhibited full understanding and wishes to proceed.        Endometrioma 2023     Overview Note:     noted      Chronic blood loss anemia 2023     Overview Note:     Fe infusions in [place]           Subjective    Prateek Gastonmike 38 y.o. presents today for surgical evaluation/treatment of the condition noted above.  She is without any new complaints or issues.    OB History    Para Term  AB Living   0 0 0 0 0 0   SAB IAB Ectopic Molar Multiple Live Births   0 0 0 0 0 0       Past Medical History:   Diagnosis Date    Iron deficiency anemia due to chronic blood loss     Ovarian cyst     Uterine fibroid        Past Surgical History:   Procedure Laterality Date    COLONOSCOPY      LAPAROSCOPY N/A 11/15/2023    LAPAROSCOPY DIAGNOSTIC [place] Phoenix Bae [place] [facility] [place]    SALPINGO-OOPHORECTOMY Left

## 2024-02-26 DIAGNOSIS — D50.0 CHRONIC BLOOD LOSS ANEMIA: ICD-10-CM

## 2024-02-26 LAB
ALBUMIN SERPL-MCNC: 3.8 G/DL (ref 3.5–5)
ALBUMIN/GLOB SERPL: 1.2 (ref 0.4–1.6)
ALP SERPL-CCNC: 80 U/L (ref 50–136)
ALT SERPL-CCNC: 19 U/L (ref 12–65)
ANION GAP SERPL CALC-SCNC: 3 MMOL/L (ref 2–11)
AST SERPL-CCNC: 11 U/L (ref 15–37)
BILIRUB SERPL-MCNC: 0.3 MG/DL (ref 0.2–1.1)
BUN SERPL-MCNC: 16 MG/DL (ref 6–23)
CALCIUM SERPL-MCNC: 10 MG/DL (ref 8.3–10.4)
CHLORIDE SERPL-SCNC: 113 MMOL/L (ref 103–113)
CO2 SERPL-SCNC: 30 MMOL/L (ref 21–32)
CREAT SERPL-MCNC: 0.6 MG/DL (ref 0.6–1)
ERYTHROCYTE [DISTWIDTH] IN BLOOD BY AUTOMATED COUNT: 27.7 % (ref 11.9–14.6)
GLOBULIN SER CALC-MCNC: 3.2 G/DL (ref 2.8–4.5)
GLUCOSE SERPL-MCNC: 102 MG/DL (ref 65–100)
HCT VFR BLD AUTO: 42.3 % (ref 35.8–46.3)
HGB BLD-MCNC: 12.3 G/DL (ref 11.7–15.4)
MCH RBC QN AUTO: 23.7 PG (ref 26.1–32.9)
MCHC RBC AUTO-ENTMCNC: 29.1 G/DL (ref 31.4–35)
MCV RBC AUTO: 81.3 FL (ref 82–102)
NRBC # BLD: 0 K/UL (ref 0–0.2)
PLATELET # BLD AUTO: 459 K/UL (ref 150–450)
PMV BLD AUTO: ABNORMAL FL (ref 9.4–12.3)
POTASSIUM SERPL-SCNC: 4.3 MMOL/L (ref 3.5–5.1)
PROT SERPL-MCNC: 7 G/DL (ref 6.3–8.2)
RBC # BLD AUTO: 5.2 M/UL (ref 4.05–5.2)
SODIUM SERPL-SCNC: 146 MMOL/L (ref 136–146)
WBC # BLD AUTO: 6.4 K/UL (ref 4.3–11.1)

## 2024-02-27 ENCOUNTER — ANESTHESIA EVENT (OUTPATIENT)
Dept: SURGERY | Age: 39
End: 2024-02-27
Payer: OTHER GOVERNMENT

## 2024-02-27 NOTE — ANESTHESIA PRE PROCEDURE
Cardiovascular:                      Neuro/Psych:               GI/Hepatic/Renal:            ROS comment: Anemia.   Endo/Other:                     Abdominal:             Vascular:          Other Findings:         Anesthesia Plan      general     ASA 2             Anesthetic plan and risks discussed with patient.              Post-op pain plan if not by surgeon: single peripheral nerve block        EDGARDO DA SILVA MD   2/27/2024

## 2024-02-28 ENCOUNTER — HOSPITAL ENCOUNTER (OUTPATIENT)
Age: 39
Setting detail: OBSERVATION
Discharge: HOME | End: 2024-02-29
Attending: OBSTETRICS & GYNECOLOGY | Admitting: OBSTETRICS & GYNECOLOGY
Payer: OTHER GOVERNMENT

## 2024-02-28 ENCOUNTER — ANESTHESIA (OUTPATIENT)
Dept: SURGERY | Age: 39
End: 2024-02-28
Payer: OTHER GOVERNMENT

## 2024-02-28 DIAGNOSIS — Z01.818 PRE-OP TESTING: Primary | ICD-10-CM

## 2024-02-28 DIAGNOSIS — Z90.710 S/P ABDOMINAL HYSTERECTOMY: ICD-10-CM

## 2024-02-28 PROBLEM — N92.1 MENORRHAGIA WITH IRREGULAR CYCLE: Status: ACTIVE | Noted: 2024-02-28

## 2024-02-28 LAB
ABO + RH BLD: NORMAL
BLOOD GROUP ANTIBODIES SERPL: NORMAL
HCG UR QL: NEGATIVE
SPECIMEN EXP DATE BLD: NORMAL

## 2024-02-28 PROCEDURE — 7100000001 HC PACU RECOVERY - ADDTL 15 MIN: Performed by: OBSTETRICS & GYNECOLOGY

## 2024-02-28 PROCEDURE — 3600000009 HC SURGERY ROBOT BASE: Performed by: OBSTETRICS & GYNECOLOGY

## 2024-02-28 PROCEDURE — 94760 N-INVAS EAR/PLS OXIMETRY 1: CPT

## 2024-02-28 PROCEDURE — 2580000003 HC RX 258: Performed by: OBSTETRICS & GYNECOLOGY

## 2024-02-28 PROCEDURE — 6360000002 HC RX W HCPCS: Performed by: OBSTETRICS & GYNECOLOGY

## 2024-02-28 PROCEDURE — 6360000002 HC RX W HCPCS: Performed by: NURSE ANESTHETIST, CERTIFIED REGISTERED

## 2024-02-28 PROCEDURE — 6370000000 HC RX 637 (ALT 250 FOR IP): Performed by: OBSTETRICS & GYNECOLOGY

## 2024-02-28 PROCEDURE — 86901 BLOOD TYPING SEROLOGIC RH(D): CPT

## 2024-02-28 PROCEDURE — C1765 ADHESION BARRIER: HCPCS | Performed by: OBSTETRICS & GYNECOLOGY

## 2024-02-28 PROCEDURE — 86850 RBC ANTIBODY SCREEN: CPT

## 2024-02-28 PROCEDURE — 6360000002 HC RX W HCPCS: Performed by: ANESTHESIOLOGY

## 2024-02-28 PROCEDURE — 2500000003 HC RX 250 WO HCPCS: Performed by: NURSE ANESTHETIST, CERTIFIED REGISTERED

## 2024-02-28 PROCEDURE — 2580000003 HC RX 258: Performed by: ANESTHESIOLOGY

## 2024-02-28 PROCEDURE — 86900 BLOOD TYPING SEROLOGIC ABO: CPT

## 2024-02-28 PROCEDURE — 2700000000 HC OXYGEN THERAPY PER DAY

## 2024-02-28 PROCEDURE — 2709999900 HC NON-CHARGEABLE SUPPLY: Performed by: OBSTETRICS & GYNECOLOGY

## 2024-02-28 PROCEDURE — G0378 HOSPITAL OBSERVATION PER HR: HCPCS

## 2024-02-28 PROCEDURE — 7100000000 HC PACU RECOVERY - FIRST 15 MIN: Performed by: OBSTETRICS & GYNECOLOGY

## 2024-02-28 PROCEDURE — 2720000010 HC SURG SUPPLY STERILE: Performed by: OBSTETRICS & GYNECOLOGY

## 2024-02-28 PROCEDURE — 3600000019 HC SURGERY ROBOT ADDTL 15MIN: Performed by: OBSTETRICS & GYNECOLOGY

## 2024-02-28 PROCEDURE — 3700000000 HC ANESTHESIA ATTENDED CARE: Performed by: OBSTETRICS & GYNECOLOGY

## 2024-02-28 PROCEDURE — S2900 ROBOTIC SURGICAL SYSTEM: HCPCS | Performed by: OBSTETRICS & GYNECOLOGY

## 2024-02-28 PROCEDURE — 3700000001 HC ADD 15 MINUTES (ANESTHESIA): Performed by: OBSTETRICS & GYNECOLOGY

## 2024-02-28 PROCEDURE — 58150 TOTAL HYSTERECTOMY: CPT | Performed by: OBSTETRICS & GYNECOLOGY

## 2024-02-28 PROCEDURE — 2500000003 HC RX 250 WO HCPCS: Performed by: ANESTHESIOLOGY

## 2024-02-28 PROCEDURE — 88307 TISSUE EXAM BY PATHOLOGIST: CPT

## 2024-02-28 PROCEDURE — 81025 URINE PREGNANCY TEST: CPT

## 2024-02-28 PROCEDURE — 94761 N-INVAS EAR/PLS OXIMETRY MLT: CPT

## 2024-02-28 RX ORDER — SODIUM CHLORIDE 0.9 % (FLUSH) 0.9 %
5-40 SYRINGE (ML) INJECTION EVERY 12 HOURS SCHEDULED
Status: DISCONTINUED | OUTPATIENT
Start: 2024-02-28 | End: 2024-02-29 | Stop reason: HOSPADM

## 2024-02-28 RX ORDER — APREPITANT 40 MG/1
40 CAPSULE ORAL ONCE
Status: COMPLETED | OUTPATIENT
Start: 2024-02-28 | End: 2024-02-28

## 2024-02-28 RX ORDER — SODIUM CHLORIDE 0.9 % (FLUSH) 0.9 %
5-40 SYRINGE (ML) INJECTION PRN
Status: DISCONTINUED | OUTPATIENT
Start: 2024-02-28 | End: 2024-02-28 | Stop reason: HOSPADM

## 2024-02-28 RX ORDER — SODIUM CHLORIDE 9 MG/ML
INJECTION, SOLUTION INTRAVENOUS PRN
Status: DISCONTINUED | OUTPATIENT
Start: 2024-02-28 | End: 2024-02-28 | Stop reason: HOSPADM

## 2024-02-28 RX ORDER — PROMETHAZINE HYDROCHLORIDE 25 MG/1
12.5 TABLET ORAL EVERY 6 HOURS PRN
Status: DISCONTINUED | OUTPATIENT
Start: 2024-02-28 | End: 2024-02-29 | Stop reason: HOSPADM

## 2024-02-28 RX ORDER — SODIUM CHLORIDE, SODIUM LACTATE, POTASSIUM CHLORIDE, CALCIUM CHLORIDE 600; 310; 30; 20 MG/100ML; MG/100ML; MG/100ML; MG/100ML
INJECTION, SOLUTION INTRAVENOUS CONTINUOUS
Status: DISCONTINUED | OUTPATIENT
Start: 2024-02-28 | End: 2024-02-28 | Stop reason: HOSPADM

## 2024-02-28 RX ORDER — BUPIVACAINE HYDROCHLORIDE 5 MG/ML
INJECTION, SOLUTION EPIDURAL; INTRACAUDAL PRN
Status: DISCONTINUED | OUTPATIENT
Start: 2024-02-28 | End: 2024-02-28 | Stop reason: ALTCHOICE

## 2024-02-28 RX ORDER — ROCURONIUM BROMIDE 10 MG/ML
INJECTION, SOLUTION INTRAVENOUS PRN
Status: DISCONTINUED | OUTPATIENT
Start: 2024-02-28 | End: 2024-02-28 | Stop reason: SDUPTHER

## 2024-02-28 RX ORDER — BISACODYL 10 MG
10 SUPPOSITORY, RECTAL RECTAL ONCE
Status: COMPLETED | OUTPATIENT
Start: 2024-02-28 | End: 2024-02-28

## 2024-02-28 RX ORDER — SODIUM CHLORIDE 9 MG/ML
INJECTION, SOLUTION INTRAVENOUS CONTINUOUS
Status: DISCONTINUED | OUTPATIENT
Start: 2024-02-28 | End: 2024-02-28 | Stop reason: HOSPADM

## 2024-02-28 RX ORDER — MORPHINE SULFATE 4 MG/ML
4 INJECTION, SOLUTION INTRAMUSCULAR; INTRAVENOUS
Status: DISCONTINUED | OUTPATIENT
Start: 2024-02-28 | End: 2024-02-29 | Stop reason: HOSPADM

## 2024-02-28 RX ORDER — FENTANYL CITRATE 50 UG/ML
100 INJECTION, SOLUTION INTRAMUSCULAR; INTRAVENOUS
Status: DISCONTINUED | OUTPATIENT
Start: 2024-02-28 | End: 2024-02-28 | Stop reason: HOSPADM

## 2024-02-28 RX ORDER — DEXTROSE, SODIUM CHLORIDE, SODIUM LACTATE, POTASSIUM CHLORIDE, AND CALCIUM CHLORIDE 5; .6; .31; .03; .02 G/100ML; G/100ML; G/100ML; G/100ML; G/100ML
INJECTION, SOLUTION INTRAVENOUS CONTINUOUS
Status: DISCONTINUED | OUTPATIENT
Start: 2024-02-28 | End: 2024-02-29 | Stop reason: HOSPADM

## 2024-02-28 RX ORDER — ONDANSETRON 2 MG/ML
INJECTION INTRAMUSCULAR; INTRAVENOUS PRN
Status: DISCONTINUED | OUTPATIENT
Start: 2024-02-28 | End: 2024-02-28 | Stop reason: SDUPTHER

## 2024-02-28 RX ORDER — DOCUSATE SODIUM 100 MG/1
100 CAPSULE, LIQUID FILLED ORAL 2 TIMES DAILY PRN
Status: DISCONTINUED | OUTPATIENT
Start: 2024-02-28 | End: 2024-02-29 | Stop reason: HOSPADM

## 2024-02-28 RX ORDER — ACETAMINOPHEN 500 MG
500 TABLET ORAL EVERY 6 HOURS PRN
Status: ON HOLD | COMMUNITY
End: 2024-02-29 | Stop reason: HOSPADM

## 2024-02-28 RX ORDER — MIDAZOLAM HYDROCHLORIDE 2 MG/2ML
2 INJECTION, SOLUTION INTRAMUSCULAR; INTRAVENOUS
Status: DISCONTINUED | OUTPATIENT
Start: 2024-02-28 | End: 2024-02-28 | Stop reason: HOSPADM

## 2024-02-28 RX ORDER — SODIUM CHLORIDE 0.9 % (FLUSH) 0.9 %
5-40 SYRINGE (ML) INJECTION EVERY 12 HOURS SCHEDULED
Status: DISCONTINUED | OUTPATIENT
Start: 2024-02-28 | End: 2024-02-28 | Stop reason: HOSPADM

## 2024-02-28 RX ORDER — NEOSTIGMINE METHYLSULFATE 1 MG/ML
INJECTION, SOLUTION INTRAVENOUS PRN
Status: DISCONTINUED | OUTPATIENT
Start: 2024-02-28 | End: 2024-02-28 | Stop reason: SDUPTHER

## 2024-02-28 RX ORDER — IBUPROFEN 800 MG/1
800 TABLET ORAL EVERY 8 HOURS SCHEDULED
Status: DISCONTINUED | OUTPATIENT
Start: 2024-02-28 | End: 2024-02-29 | Stop reason: HOSPADM

## 2024-02-28 RX ORDER — PROPOFOL 10 MG/ML
INJECTION, EMULSION INTRAVENOUS PRN
Status: DISCONTINUED | OUTPATIENT
Start: 2024-02-28 | End: 2024-02-28 | Stop reason: SDUPTHER

## 2024-02-28 RX ORDER — FENTANYL CITRATE 50 UG/ML
25 INJECTION, SOLUTION INTRAMUSCULAR; INTRAVENOUS
Status: DISCONTINUED | OUTPATIENT
Start: 2024-02-28 | End: 2024-02-28 | Stop reason: HOSPADM

## 2024-02-28 RX ORDER — KETAMINE HCL IN NACL, ISO-OSM 20 MG/2 ML
10 SYRINGE (ML) INJECTION
Status: DISCONTINUED | OUTPATIENT
Start: 2024-02-28 | End: 2024-02-28 | Stop reason: HOSPADM

## 2024-02-28 RX ORDER — OXYCODONE HYDROCHLORIDE 5 MG/1
5 TABLET ORAL EVERY 4 HOURS PRN
Status: DISCONTINUED | OUTPATIENT
Start: 2024-02-28 | End: 2024-02-29 | Stop reason: HOSPADM

## 2024-02-28 RX ORDER — DIPHENHYDRAMINE HYDROCHLORIDE 50 MG/ML
6.25 INJECTION INTRAMUSCULAR; INTRAVENOUS
Status: DISCONTINUED | OUTPATIENT
Start: 2024-02-28 | End: 2024-02-28 | Stop reason: HOSPADM

## 2024-02-28 RX ORDER — KETAMINE HCL IN NACL, ISO-OSM 20 MG/2 ML
SYRINGE (ML) INJECTION PRN
Status: DISCONTINUED | OUTPATIENT
Start: 2024-02-28 | End: 2024-02-28 | Stop reason: SDUPTHER

## 2024-02-28 RX ORDER — DEXAMETHASONE SODIUM PHOSPHATE 10 MG/ML
INJECTION INTRAMUSCULAR; INTRAVENOUS PRN
Status: DISCONTINUED | OUTPATIENT
Start: 2024-02-28 | End: 2024-02-28 | Stop reason: SDUPTHER

## 2024-02-28 RX ORDER — KETOROLAC TROMETHAMINE 30 MG/ML
INJECTION, SOLUTION INTRAMUSCULAR; INTRAVENOUS PRN
Status: DISCONTINUED | OUTPATIENT
Start: 2024-02-28 | End: 2024-02-28 | Stop reason: SDUPTHER

## 2024-02-28 RX ORDER — MIDAZOLAM HYDROCHLORIDE 1 MG/ML
INJECTION INTRAMUSCULAR; INTRAVENOUS PRN
Status: DISCONTINUED | OUTPATIENT
Start: 2024-02-28 | End: 2024-02-28 | Stop reason: SDUPTHER

## 2024-02-28 RX ORDER — HYDROMORPHONE HYDROCHLORIDE 1 MG/ML
0.5 INJECTION, SOLUTION INTRAMUSCULAR; INTRAVENOUS; SUBCUTANEOUS EVERY 5 MIN PRN
Status: COMPLETED | OUTPATIENT
Start: 2024-02-28 | End: 2024-02-28

## 2024-02-28 RX ORDER — LIDOCAINE HYDROCHLORIDE 20 MG/ML
INJECTION, SOLUTION EPIDURAL; INFILTRATION; INTRACAUDAL; PERINEURAL PRN
Status: DISCONTINUED | OUTPATIENT
Start: 2024-02-28 | End: 2024-02-28 | Stop reason: SDUPTHER

## 2024-02-28 RX ORDER — SIMETHICONE 80 MG
80 TABLET,CHEWABLE ORAL 2 TIMES DAILY PRN
Status: DISCONTINUED | OUTPATIENT
Start: 2024-02-28 | End: 2024-02-28

## 2024-02-28 RX ORDER — OXYCODONE HYDROCHLORIDE 5 MG/1
10 TABLET ORAL EVERY 4 HOURS PRN
Status: DISCONTINUED | OUTPATIENT
Start: 2024-02-28 | End: 2024-02-29 | Stop reason: HOSPADM

## 2024-02-28 RX ORDER — MORPHINE SULFATE 2 MG/ML
2 INJECTION, SOLUTION INTRAMUSCULAR; INTRAVENOUS
Status: DISCONTINUED | OUTPATIENT
Start: 2024-02-28 | End: 2024-02-29 | Stop reason: HOSPADM

## 2024-02-28 RX ORDER — HYDROMORPHONE HYDROCHLORIDE 2 MG/ML
INJECTION, SOLUTION INTRAMUSCULAR; INTRAVENOUS; SUBCUTANEOUS PRN
Status: DISCONTINUED | OUTPATIENT
Start: 2024-02-28 | End: 2024-02-28 | Stop reason: SDUPTHER

## 2024-02-28 RX ORDER — LIDOCAINE HYDROCHLORIDE 10 MG/ML
1 INJECTION, SOLUTION INFILTRATION; PERINEURAL
Status: DISCONTINUED | OUTPATIENT
Start: 2024-02-28 | End: 2024-02-28 | Stop reason: HOSPADM

## 2024-02-28 RX ORDER — SIMETHICONE 80 MG
80 TABLET,CHEWABLE ORAL ONCE
Status: COMPLETED | OUTPATIENT
Start: 2024-02-28 | End: 2024-02-28

## 2024-02-28 RX ORDER — HYDROMORPHONE HYDROCHLORIDE 1 MG/ML
0.25 INJECTION, SOLUTION INTRAMUSCULAR; INTRAVENOUS; SUBCUTANEOUS EVERY 5 MIN PRN
Status: DISCONTINUED | OUTPATIENT
Start: 2024-02-28 | End: 2024-02-28 | Stop reason: HOSPADM

## 2024-02-28 RX ORDER — FENTANYL CITRATE 50 UG/ML
INJECTION, SOLUTION INTRAMUSCULAR; INTRAVENOUS PRN
Status: DISCONTINUED | OUTPATIENT
Start: 2024-02-28 | End: 2024-02-28 | Stop reason: SDUPTHER

## 2024-02-28 RX ORDER — ACETAMINOPHEN 500 MG
1000 TABLET ORAL EVERY 8 HOURS PRN
Status: DISCONTINUED | OUTPATIENT
Start: 2024-02-28 | End: 2024-02-29 | Stop reason: HOSPADM

## 2024-02-28 RX ORDER — SIMETHICONE 80 MG
80 TABLET,CHEWABLE ORAL 4 TIMES DAILY PRN
Status: DISCONTINUED | OUTPATIENT
Start: 2024-02-28 | End: 2024-02-29 | Stop reason: HOSPADM

## 2024-02-28 RX ORDER — SODIUM CHLORIDE 0.9 % (FLUSH) 0.9 %
5-40 SYRINGE (ML) INJECTION PRN
Status: DISCONTINUED | OUTPATIENT
Start: 2024-02-28 | End: 2024-02-29 | Stop reason: HOSPADM

## 2024-02-28 RX ORDER — OXYCODONE HYDROCHLORIDE 5 MG/1
5 TABLET ORAL PRN
Status: DISCONTINUED | OUTPATIENT
Start: 2024-02-28 | End: 2024-02-28 | Stop reason: HOSPADM

## 2024-02-28 RX ORDER — SODIUM CHLORIDE 9 MG/ML
INJECTION, SOLUTION INTRAVENOUS PRN
Status: DISCONTINUED | OUTPATIENT
Start: 2024-02-28 | End: 2024-02-29 | Stop reason: HOSPADM

## 2024-02-28 RX ORDER — ONDANSETRON 2 MG/ML
4 INJECTION INTRAMUSCULAR; INTRAVENOUS EVERY 6 HOURS PRN
Status: DISCONTINUED | OUTPATIENT
Start: 2024-02-28 | End: 2024-02-29 | Stop reason: HOSPADM

## 2024-02-28 RX ORDER — GLYCOPYRROLATE 0.2 MG/ML
INJECTION INTRAMUSCULAR; INTRAVENOUS PRN
Status: DISCONTINUED | OUTPATIENT
Start: 2024-02-28 | End: 2024-02-28 | Stop reason: SDUPTHER

## 2024-02-28 RX ORDER — ONDANSETRON 2 MG/ML
4 INJECTION INTRAMUSCULAR; INTRAVENOUS
Status: DISCONTINUED | OUTPATIENT
Start: 2024-02-28 | End: 2024-02-28 | Stop reason: HOSPADM

## 2024-02-28 RX ORDER — OXYCODONE HYDROCHLORIDE 5 MG/1
10 TABLET ORAL PRN
Status: DISCONTINUED | OUTPATIENT
Start: 2024-02-28 | End: 2024-02-28 | Stop reason: HOSPADM

## 2024-02-28 RX ADMIN — BISACODYL 10 MG: 10 SUPPOSITORY RECTAL at 22:23

## 2024-02-28 RX ADMIN — PROPOFOL 200 MG: 10 INJECTION, EMULSION INTRAVENOUS at 07:32

## 2024-02-28 RX ADMIN — ROCURONIUM BROMIDE 40 MG: 10 INJECTION, SOLUTION INTRAVENOUS at 07:32

## 2024-02-28 RX ADMIN — HYDROMORPHONE HYDROCHLORIDE 0.2 MG: 2 INJECTION INTRAMUSCULAR; INTRAVENOUS; SUBCUTANEOUS at 08:55

## 2024-02-28 RX ADMIN — ROCURONIUM BROMIDE 10 MG: 10 INJECTION, SOLUTION INTRAVENOUS at 08:53

## 2024-02-28 RX ADMIN — HYDROMORPHONE HYDROCHLORIDE 0.5 MG: 1 INJECTION, SOLUTION INTRAMUSCULAR; INTRAVENOUS; SUBCUTANEOUS at 10:35

## 2024-02-28 RX ADMIN — OXYCODONE 10 MG: 5 TABLET ORAL at 11:47

## 2024-02-28 RX ADMIN — SODIUM CHLORIDE, SODIUM LACTATE, POTASSIUM CHLORIDE, AND CALCIUM CHLORIDE: 600; 310; 30; 20 INJECTION, SOLUTION INTRAVENOUS at 07:41

## 2024-02-28 RX ADMIN — DEXAMETHASONE SODIUM PHOSPHATE 8 MG: 10 INJECTION INTRAMUSCULAR; INTRAVENOUS at 08:10

## 2024-02-28 RX ADMIN — HYDROMORPHONE HYDROCHLORIDE 0.5 MG: 1 INJECTION, SOLUTION INTRAMUSCULAR; INTRAVENOUS; SUBCUTANEOUS at 10:25

## 2024-02-28 RX ADMIN — Medication 5 MG: at 09:55

## 2024-02-28 RX ADMIN — SIMETHICONE 80 MG: 80 TABLET, CHEWABLE ORAL at 18:16

## 2024-02-28 RX ADMIN — HYDROMORPHONE HYDROCHLORIDE 0.5 MG: 1 INJECTION, SOLUTION INTRAMUSCULAR; INTRAVENOUS; SUBCUTANEOUS at 10:20

## 2024-02-28 RX ADMIN — Medication 2 G: at 07:20

## 2024-02-28 RX ADMIN — MIDAZOLAM 2 MG: 1 INJECTION INTRAMUSCULAR; INTRAVENOUS at 07:23

## 2024-02-28 RX ADMIN — DOCUSATE SODIUM 100 MG: 100 CAPSULE, LIQUID FILLED ORAL at 17:51

## 2024-02-28 RX ADMIN — SODIUM CHLORIDE, SODIUM LACTATE, POTASSIUM CHLORIDE, AND CALCIUM CHLORIDE: 600; 310; 30; 20 INJECTION, SOLUTION INTRAVENOUS at 06:38

## 2024-02-28 RX ADMIN — SODIUM CHLORIDE, SODIUM LACTATE, POTASSIUM CHLORIDE, CALCIUM CHLORIDE AND DEXTROSE MONOHYDRATE: 5; 600; 310; 30; 20 INJECTION, SOLUTION INTRAVENOUS at 21:05

## 2024-02-28 RX ADMIN — HYDROMORPHONE HYDROCHLORIDE 0.5 MG: 1 INJECTION, SOLUTION INTRAMUSCULAR; INTRAVENOUS; SUBCUTANEOUS at 10:30

## 2024-02-28 RX ADMIN — ONDANSETRON 4 MG: 2 INJECTION INTRAMUSCULAR; INTRAVENOUS at 09:18

## 2024-02-28 RX ADMIN — Medication 20 MG: at 09:19

## 2024-02-28 RX ADMIN — Medication 10 MG: at 10:37

## 2024-02-28 RX ADMIN — GLYCOPYRROLATE 0.5 MG: 0.2 INJECTION INTRAMUSCULAR; INTRAVENOUS at 09:55

## 2024-02-28 RX ADMIN — SODIUM CHLORIDE, SODIUM LACTATE, POTASSIUM CHLORIDE, CALCIUM CHLORIDE AND DEXTROSE MONOHYDRATE: 5; 600; 310; 30; 20 INJECTION, SOLUTION INTRAVENOUS at 11:56

## 2024-02-28 RX ADMIN — FENTANYL CITRATE 100 MCG: 50 INJECTION, SOLUTION INTRAMUSCULAR; INTRAVENOUS at 07:32

## 2024-02-28 RX ADMIN — HYDROMORPHONE HYDROCHLORIDE 0.4 MG: 2 INJECTION INTRAMUSCULAR; INTRAVENOUS; SUBCUTANEOUS at 10:12

## 2024-02-28 RX ADMIN — OXYCODONE 10 MG: 5 TABLET ORAL at 17:57

## 2024-02-28 RX ADMIN — Medication 20 MG: at 07:37

## 2024-02-28 RX ADMIN — KETOROLAC TROMETHAMINE 30 MG: 30 INJECTION, SOLUTION INTRAMUSCULAR; INTRAVENOUS at 09:21

## 2024-02-28 RX ADMIN — HYDROMORPHONE HYDROCHLORIDE 0.2 MG: 2 INJECTION INTRAMUSCULAR; INTRAVENOUS; SUBCUTANEOUS at 08:24

## 2024-02-28 RX ADMIN — IBUPROFEN 800 MG: 800 TABLET, FILM COATED ORAL at 20:59

## 2024-02-28 RX ADMIN — APREPITANT 40 MG: 40 CAPSULE ORAL at 07:05

## 2024-02-28 RX ADMIN — LIDOCAINE HYDROCHLORIDE 80 MG: 20 INJECTION, SOLUTION EPIDURAL; INFILTRATION; INTRACAUDAL; PERINEURAL at 07:32

## 2024-02-28 RX ADMIN — ROCURONIUM BROMIDE 10 MG: 10 INJECTION, SOLUTION INTRAVENOUS at 08:07

## 2024-02-28 RX ADMIN — SODIUM CHLORIDE, PRESERVATIVE FREE 10 ML: 5 INJECTION INTRAVENOUS at 11:57

## 2024-02-28 ASSESSMENT — PAIN DESCRIPTION - ORIENTATION: ORIENTATION: LOWER

## 2024-02-28 ASSESSMENT — PAIN SCALES - GENERAL
PAINLEVEL_OUTOF10: 10
PAINLEVEL_OUTOF10: 9
PAINLEVEL_OUTOF10: 8
PAINLEVEL_OUTOF10: 10

## 2024-02-28 ASSESSMENT — PAIN - FUNCTIONAL ASSESSMENT: PAIN_FUNCTIONAL_ASSESSMENT: 0-10

## 2024-02-28 ASSESSMENT — PAIN DESCRIPTION - LOCATION
LOCATION: ABDOMEN

## 2024-02-28 ASSESSMENT — PAIN DESCRIPTION - DESCRIPTORS: DESCRIPTORS: THROBBING

## 2024-02-28 NOTE — PERIOP NOTE
Nurse to call MD pate regarding patients 10/10 pain at this time which is unresolved from 2mg dilaudid. Verbal order for `10mg ketamine IV push PRN q15 mins for 2 doses.

## 2024-02-28 NOTE — INTERVAL H&P NOTE
I have examined and spoken with the patient this morning.  She has no new medical issues or complaints.  She reports no new medicines since H&P.   She again understands procedure, risks/benefits and agrees to proceed.  Phoenix Bae MD  7:12 AM  02/28/24

## 2024-02-28 NOTE — ANESTHESIA POSTPROCEDURE EVALUATION
Department of Anesthesiology  Postprocedure Note    Patient: Prateek Llamas  MRN: [id number]  YOB: 1985  Date of evaluation: 2/28/2024    Procedure Summary       Date: 02/28/24 [place] [facility] [place] [address] [place] [address] MAIN OR    Anesthesia Start: 0719 Anesthesia Stop: 1015    Procedure: ROBOTIC ASSISTED HYSTERECTOMY BILATERAL SALPINGECTOMY/ CONVERTED TO OPEN (Abdomen) Diagnosis:       Menorrhagia with regular cycle      Endometriosis determined by laparoscopy      Dysmenorrhea      (Menorrhagia with regular cycle [N92.0])      (Endometriosis determined by laparoscopy [N80.9])      (Dysmenorrhea [N94.6])    Surgeons: Phoenix Bae MD Responsible Provider: Jose Love MD    Anesthesia Type: general ASA Status: 2            Anesthesia Type: No value filed.    Jennifer Phase I: Jennifer Score: 9    Jennifer Phase II:      Anesthesia Post Evaluation    Patient location during evaluation: PACU  Patient participation: complete - patient participated  Level of consciousness: awake  Airway patency: patent  Nausea & Vomiting: no nausea  Cardiovascular status: blood pressure returned to baseline and hemodynamically stable  Respiratory status: acceptable  Hydration status: stable  Multimodal analgesia pain management approach  Pain management: adequate    No notable events documented.

## 2024-02-28 NOTE — PROGRESS NOTES
TRANSFER - IN REPORT:    Verbal report received from PACU on Prateek Gastonmike  being received from ISIDRA Flanagan  for routine post-op      Report consisted of patient's Situation, Background, Assessment and   Recommendations(SBAR).     Information from the following report(s) Nurse Handoff Report and Surgery Report was reviewed with the receiving nurse.    Opportunity for questions and clarification was provided.      Assessment completed upon patient's arrival to unit and care assumed.

## 2024-02-28 NOTE — PERIOP NOTE
TRANSFER - OUT REPORT:    Verbal report given to Tremayne MINER on Prateek Llamas  being transferred to  340 for routine progression of patient care       Report consisted of patient's Situation, Background, Assessment and   Recommendations(SBAR).     Information from the following report(s) Nurse Handoff Report, Adult Overview, Surgery Report, MAR, and Cardiac Rhythm SR  was reviewed with the receiving nurse.           Lines:   Peripheral IV 02/28/24 Posterior;Right Hand (Active)   Site Assessment Clean, dry & intact 02/28/24 1013   Line Status Infusing 02/28/24 1013   Line Care Connections checked and tightened 02/28/24 1013   Phlebitis Assessment No symptoms 02/28/24 1013   Infiltration Assessment 0 02/28/24 1013   Alcohol Cap Used No 02/28/24 1013   Dressing Status Clean, dry & intact 02/28/24 1013   Dressing Type Transparent 02/28/24 1013        Opportunity for questions and clarification was provided.      Patient transported with:  O2 @ 2lpm

## 2024-02-28 NOTE — OP NOTE
Subcutaneous tissue reapproximated with 3-0 vicryl rapide in a running fashion.  Skin closed with Insorb in a subcuticular fashion.  Pt tolerated procedure well.  Sponge, lap and needle counts correct x 3.    Disposition: Pt to RR stable    Pathology: Uterus, cervix and bilateral fallopian tubes       Phoenix Bae MD  9:58 AM  02/28/24

## 2024-02-29 VITALS
HEART RATE: 76 BPM | DIASTOLIC BLOOD PRESSURE: 49 MMHG | WEIGHT: 176.5 LBS | TEMPERATURE: 99 F | OXYGEN SATURATION: 99 % | SYSTOLIC BLOOD PRESSURE: 108 MMHG | BODY MASS INDEX: 26.14 KG/M2 | RESPIRATION RATE: 19 BRPM | HEIGHT: 69 IN

## 2024-02-29 LAB
ERYTHROCYTE [DISTWIDTH] IN BLOOD BY AUTOMATED COUNT: 26.3 % (ref 11.9–14.6)
HCT VFR BLD AUTO: 33 % (ref 35.8–46.3)
HGB BLD-MCNC: 9.8 G/DL (ref 11.7–15.4)
MCH RBC QN AUTO: 23.6 PG (ref 26.1–32.9)
MCHC RBC AUTO-ENTMCNC: 29.7 G/DL (ref 31.4–35)
MCV RBC AUTO: 79.5 FL (ref 82–102)
NRBC # BLD: 0 K/UL (ref 0–0.2)
PLATELET # BLD AUTO: 249 K/UL (ref 150–450)
PMV BLD AUTO: ABNORMAL FL (ref 9.4–12.3)
RBC # BLD AUTO: 4.15 M/UL (ref 4.05–5.2)
WBC # BLD AUTO: 7.4 K/UL (ref 4.3–11.1)

## 2024-02-29 PROCEDURE — 36415 COLL VENOUS BLD VENIPUNCTURE: CPT

## 2024-02-29 PROCEDURE — 6370000000 HC RX 637 (ALT 250 FOR IP): Performed by: OBSTETRICS & GYNECOLOGY

## 2024-02-29 PROCEDURE — G0378 HOSPITAL OBSERVATION PER HR: HCPCS

## 2024-02-29 PROCEDURE — 85027 COMPLETE CBC AUTOMATED: CPT

## 2024-02-29 PROCEDURE — 2580000003 HC RX 258: Performed by: OBSTETRICS & GYNECOLOGY

## 2024-02-29 RX ORDER — IBUPROFEN 600 MG/1
600 TABLET ORAL EVERY 6 HOURS PRN
Qty: 60 TABLET | Refills: 1 | Status: SHIPPED | OUTPATIENT
Start: 2024-02-29

## 2024-02-29 RX ORDER — OMEPRAZOLE 20 MG/1
40 CAPSULE, DELAYED RELEASE ORAL
Status: DISCONTINUED | OUTPATIENT
Start: 2024-02-29 | End: 2024-02-29

## 2024-02-29 RX ORDER — PANTOPRAZOLE SODIUM 40 MG/1
40 TABLET, DELAYED RELEASE ORAL 2 TIMES DAILY WITH MEALS
Status: DISCONTINUED | OUTPATIENT
Start: 2024-02-29 | End: 2024-02-29 | Stop reason: HOSPADM

## 2024-02-29 RX ORDER — OXYCODONE HYDROCHLORIDE AND ACETAMINOPHEN 5; 325 MG/1; MG/1
1 TABLET ORAL EVERY 6 HOURS PRN
Qty: 28 TABLET | Refills: 0 | Status: SHIPPED | OUTPATIENT
Start: 2024-02-29 | End: 2024-03-07

## 2024-02-29 RX ADMIN — SODIUM CHLORIDE, SODIUM LACTATE, POTASSIUM CHLORIDE, CALCIUM CHLORIDE AND DEXTROSE MONOHYDRATE: 5; 600; 310; 30; 20 INJECTION, SOLUTION INTRAVENOUS at 03:59

## 2024-02-29 RX ADMIN — PANTOPRAZOLE SODIUM 40 MG: 40 TABLET, DELAYED RELEASE ORAL at 09:48

## 2024-02-29 RX ADMIN — OXYCODONE 10 MG: 5 TABLET ORAL at 09:48

## 2024-02-29 RX ADMIN — DOCUSATE SODIUM 100 MG: 100 CAPSULE, LIQUID FILLED ORAL at 09:49

## 2024-02-29 RX ADMIN — SODIUM CHLORIDE, PRESERVATIVE FREE 5 ML: 5 INJECTION INTRAVENOUS at 09:50

## 2024-02-29 ASSESSMENT — PAIN DESCRIPTION - ORIENTATION: ORIENTATION: LOWER

## 2024-02-29 ASSESSMENT — PAIN SCALES - GENERAL: PAINLEVEL_OUTOF10: 5

## 2024-02-29 ASSESSMENT — PAIN DESCRIPTION - DESCRIPTORS: DESCRIPTORS: ACHING

## 2024-02-29 ASSESSMENT — PAIN DESCRIPTION - LOCATION: LOCATION: ABDOMEN

## 2024-02-29 NOTE — PROGRESS NOTES
Pt medicated and IVF infusing per MAR. Pt and significant other are very adamant about getting rid of gas pain. MD notified via phone call, qid Mylicon was ordered, however pt then states she does not want to take additional medication. RN offered coffee, pt accepted. Pt was eventually agreeable to take a one time dose of dulcolax suppository which was ordered by Dr. Bae. She states that she has had 2 bowel movements this shift. Education provided about progressive ambulation, pt walked about 100 ft within room this shift. Dressings C, D, & I. Significant other is bedside. All known needs met at this time. Bed locked and lowered with call light within reach.

## 2024-02-29 NOTE — PROGRESS NOTES
[facility]  [address], [place] [address]  [phone]    Phoenix Bae MD, FACOG  Kena Myrick [facility]  Cheri Gaston MD, FACOG    Post-Op Rounds    Pt is S/P ERNIE-BS for menorrhagia, endometriosis .  No complaints today.  Normal PO pain.  No GI/ issues.  No F/C, CP, SOB    VITALS  Patient Vitals for the past 24 hrs:   BP Temp Temp src Pulse Resp SpO2   02/29/24 0701 (!) 108/49 99 °F (37.2 °C) Oral 76 19 99 %   02/29/24 0357 136/70 98.4 °F (36.9 °C) Oral 87 20 98 %   02/28/24 2255 (!) 143/84 98.6 °F (37 °C) Oral 97 26 97 %   02/28/24 2128 -- -- -- 86 -- 98 %   02/28/24 1901 134/74 98 °F (36.7 °C) Oral 86 16 98 %   02/28/24 1330 (!) 149/80 97.6 °F (36.4 °C) Oral 66 -- 100 %        CV - RRR  LUNGS - CTA bilaterally  ABD - soft, NABS, appropriate tenderness, incision C/D/I  EXT - tr edema bilaterally    Labs:    Recent Results (from the past 24 hour(s))   CBC    Collection Time: 02/29/24  5:16 AM   Result Value Ref Range    WBC 7.4 4.3 - 11.1 K/uL    RBC 4.15 4.05 - 5.2 M/uL    Hemoglobin 9.8 (L) 11.7 - 15.4 g/dL    Hematocrit 33.0 (L) 35.8 - 46.3 %    MCV 79.5 (L) 82.0 - 102.0 FL    MCH 23.6 (L) 26.1 - 32.9 PG    MCHC 29.7 (L) 31.4 - 35.0 g/dL    RDW 26.3 (H) 11.9 - 14.6 %    Platelets 249 150 - 450 K/uL    MPV Unable to calculate. Recommend adding IPF. 9.4 - 12.3 FL    nRBC 0.00 0.0 - 0.2 K/uL        POD #1     Stable. (+) ambulating, voiding. Pt desires D/C today. Routine PO instructions    Phoenix Bae MD  1:11 PM  02/29/24

## 2024-02-29 NOTE — PROGRESS NOTES
Called b/c pt wants to talk with her surgeon about the surgery this afternoon  Nurse to tell pt her surgeon will see her jerald    Then called for gas x    Then called again for repeat gas x. Changed order to qid prn

## 2024-02-29 NOTE — PROGRESS NOTES
Discharge instructions reviewed with Pt. Opportunity for questions and clarification given. IV removed and cath tip intact. Scripts sent to pharmacy. Education given to pt and pt was able to teach back. No needs at this time and pt waiting on ride to arrive.

## 2024-02-29 NOTE — PROGRESS NOTES
Pt requesting home medication of 40 mg omeprazole, MD notified via telephone call, ___ ordered and administered.

## 2024-03-05 ENCOUNTER — TELEPHONE (OUTPATIENT)
Dept: OBGYN CLINIC | Age: 39
End: 2024-03-05

## 2024-03-05 NOTE — TELEPHONE ENCOUNTER
Patient LM stating she has been running a fever of 100.8. She stated she had hysterectomy last Wednesday.     I called the patient to evaluate her symptoms. Patient states she had a temp of 100.4 on Thursday then it went a way until yesterday. Patient states her temp yesterday was 100.8 but did go down last night. Patient states she just took her temp which was 99.4. Patient states she is taking oxy and ibuprofen at night. Patient denies any vaginal bleeding, n/v, uti symptoms, upper respiratory symptoms. Patient states she did have some diarrhea yesterday. Patient states her incision looks good, but does have some abd pain on both sides.     I spoke with Kena. She recommended patient come in tomorrow at 9:30 am. She also recommended patient take the ibuprofen during the day. Patient voiced understanding.

## 2024-03-06 ENCOUNTER — OFFICE VISIT (OUTPATIENT)
Dept: OBGYN CLINIC | Age: 39
End: 2024-03-06
Payer: OTHER GOVERNMENT

## 2024-03-06 VITALS
HEART RATE: 81 BPM | TEMPERATURE: 98.6 F | DIASTOLIC BLOOD PRESSURE: 68 MMHG | SYSTOLIC BLOOD PRESSURE: 118 MMHG | OXYGEN SATURATION: 98 % | HEIGHT: 69 IN | WEIGHT: 172 LBS | BODY MASS INDEX: 25.48 KG/M2

## 2024-03-06 DIAGNOSIS — R50.82 POSTOPERATIVE FEVER: Primary | ICD-10-CM

## 2024-03-06 DIAGNOSIS — R50.82 POSTOPERATIVE FEVER: ICD-10-CM

## 2024-03-06 LAB
ALBUMIN SERPL-MCNC: 3.5 G/DL (ref 3.5–5)
ALBUMIN/GLOB SERPL: 1 (ref 0.4–1.6)
ALP SERPL-CCNC: 74 U/L (ref 50–136)
ALT SERPL-CCNC: 17 U/L (ref 12–65)
ANION GAP SERPL CALC-SCNC: 4 MMOL/L (ref 2–11)
AST SERPL-CCNC: 17 U/L (ref 15–37)
BILIRUB SERPL-MCNC: 0.5 MG/DL (ref 0.2–1.1)
BILIRUBIN, URINE, POC: NEGATIVE
BLOOD URINE, POC: NEGATIVE
BUN SERPL-MCNC: 13 MG/DL (ref 6–23)
CALCIUM SERPL-MCNC: 9.8 MG/DL (ref 8.3–10.4)
CHLORIDE SERPL-SCNC: 111 MMOL/L (ref 103–113)
CO2 SERPL-SCNC: 29 MMOL/L (ref 21–32)
CREAT SERPL-MCNC: 0.7 MG/DL (ref 0.6–1)
ERYTHROCYTE [DISTWIDTH] IN BLOOD BY AUTOMATED COUNT: 26.7 % (ref 11.9–14.6)
GLOBULIN SER CALC-MCNC: 3.4 G/DL (ref 2.8–4.5)
GLUCOSE SERPL-MCNC: 94 MG/DL (ref 65–100)
GLUCOSE URINE, POC: NEGATIVE
HCT VFR BLD AUTO: 37.1 % (ref 35.8–46.3)
HGB BLD-MCNC: 10.9 G/DL (ref 11.7–15.4)
KETONES, URINE, POC: NEGATIVE
LEUKOCYTE ESTERASE, URINE, POC: NEGATIVE
MCH RBC QN AUTO: 23.6 PG (ref 26.1–32.9)
MCHC RBC AUTO-ENTMCNC: 29.4 G/DL (ref 31.4–35)
MCV RBC AUTO: 80.3 FL (ref 82–102)
NITRITE, URINE, POC: NEGATIVE
NRBC # BLD: 0 K/UL (ref 0–0.2)
PH, URINE, POC: 6.5 (ref 4.6–8)
PLATELET # BLD AUTO: 264 K/UL (ref 150–450)
PMV BLD AUTO: ABNORMAL FL (ref 9.4–12.3)
POTASSIUM SERPL-SCNC: 4.3 MMOL/L (ref 3.5–5.1)
PROT SERPL-MCNC: 6.9 G/DL (ref 6.3–8.2)
PROTEIN,URINE, POC: NEGATIVE
RBC # BLD AUTO: 4.62 M/UL (ref 4.05–5.2)
SODIUM SERPL-SCNC: 144 MMOL/L (ref 136–146)
SPECIFIC GRAVITY, URINE, POC: 1.02 (ref 1–1.03)
URINALYSIS CLARITY, POC: CLEAR
URINALYSIS COLOR, POC: YELLOW
UROBILINOGEN, POC: NORMAL
WBC # BLD AUTO: 8.6 K/UL (ref 4.3–11.1)

## 2024-03-06 PROCEDURE — 81001 URINALYSIS AUTO W/SCOPE: CPT | Performed by: NURSE PRACTITIONER

## 2024-03-06 PROCEDURE — 99024 POSTOP FOLLOW-UP VISIT: CPT | Performed by: NURSE PRACTITIONER

## 2024-03-06 NOTE — PROGRESS NOTES
Patient comes in today for post op complication of fever. Patient had a hysterectomy on 02/28/2024. Patient states she had a temp of 100.4 on Thursday then it went a way until yesterday. Patient states her temp yesterday was 100.8 but did go down last night. Patient states she just took her temp yesterday around 5 pm which was 99.4. Patient denies any vaginal bleeding, n/v, uti symptoms, upper respiratory symptoms. Patient states she did have some diarrhea Monday. Patient states her incision looks good, but does have some abd pain on both sides.     Patient states that she took ibuprofen and oxy last night. She stated before bed her temp was 97.0. Patient states so far she has not had a temp today.     LAST PAP:  07/24/2023, Negative HPV Negative    LAST MAMMO:  Never    LMP:  Patient's last menstrual period was 11/16/2023 (exact date).    BIRTH CONTROL:  status post hysterectomy    TOBACCO USE:  No    FAMILY HISTORY OF:   Breast Cancer:  No   Ovarian Cancer:  No   Uterine Cancer:  No   Colon Cancer:  No    Vitals:    03/06/24 0934   BP: 118/68   Site: Left Upper Arm   Position: Sitting   Pulse: 81   Temp: 98.6 °F (37 °C)   TempSrc: Oral   SpO2: 98%   Weight: 78 kg (172 lb)   Height: 1.753 m (5' 9\")      Chaperone for Intimate Exam     Chaperone was offer accepted as part of the rooming process    Chaperone: Genoveva Cardenas MA  03/06/24  10:09 AM   
normal female genitalia without lesions or masses       Vagina: limited view due to pt intolerance but no discharge, odor, swelling present        Neurological: No Facial Asymmetry (Cranial nerve 7 motor function) No gaze palsy    Skin: No significant exanthematous lesions or discoloration noted on facial skin      Psych:  Alert and awake. Oriented to person/place/time. Able to follow commands            Assessment/Plan    Patient Active Problem List    Diagnosis Date Noted    Postoperative fever 03/06/2024     Assessment & Plan Note:      Afebrile today  Incision site well healed, C/D/I, small amount of swelling  Vaginal exam limited due to pt intolerance for exam  Will check cbc, cmp  If elevated WBCs, plan pelvic CT  Pathology reviewed      Menorrhagia with irregular cycle 02/28/2024    S/P abdominal hysterectomy 02/28/2024    Endometriosis determined by laparoscopy 12/05/2023     Overview Note:     Noted on Dx lap 11/15/23      Dysmenorrhea 07/24/2023     Overview Note:     noted      Intramural, submucous, and subserous leiomyoma of uterus 07/24/2023     Overview Note:     [facility]  Uterus 9.4 x 6.9 x 8.3 cm with anterior fibroid 6.0 x 5.3 cm abutting cavity, small fundal fibroid 2.2 cm.  Multiple suspected endometriomas with left large one 7.2 x 5.4 cm, right one 2.1 x 2.1 cm      Menorrhagia with regular cycle 07/24/2023     Overview Note:     Pt has failed outpt medical and surgical mgmt and desires more definitive treatment at this point    PLAN:  AYANA  D/W pt at length risks/benefits of procedure including but not limited to death, bleeding, infection, possible treatment failure and damage to other internal organs. She exhibited full understanding and wishes to proceed.        Endometrioma 07/24/2023     Overview Note:     noted      Chronic blood loss anemia 07/24/2023     Overview Note:     Fe infusions in [place]         Problem List Items Addressed This Visit          Other    Postoperative

## 2024-03-06 NOTE — ASSESSMENT & PLAN NOTE
Afebrile today  Incision site well healed, C/D/I, small amount of swelling  Vaginal exam limited due to pt intolerance for exam  Will check cbc, cmp  If elevated WBCs, plan pelvic CT

## 2024-03-07 ENCOUNTER — TELEPHONE (OUTPATIENT)
Dept: OBGYN CLINIC | Age: 39
End: 2024-03-07

## 2024-03-07 NOTE — TELEPHONE ENCOUNTER
Called pt, message below reviewed with pt, pt voiced understanding and stated she feels great and no more fevers.

## 2024-03-07 NOTE — TELEPHONE ENCOUNTER
Please let pt know white blood cell count is normal. Her anemia is improving after surgery. CMP is normal (electrolytes, kidney, liver function)    How is she feeling today. Any more fevers?

## 2024-03-10 PROBLEM — N94.6 DYSMENORRHEA: Status: RESOLVED | Noted: 2023-07-24 | Resolved: 2024-03-10

## 2024-03-10 PROBLEM — N92.1 MENORRHAGIA WITH IRREGULAR CYCLE: Status: RESOLVED | Noted: 2024-02-28 | Resolved: 2024-03-10

## 2024-03-10 PROBLEM — D25.2 INTRAMURAL, SUBMUCOUS, AND SUBSEROUS LEIOMYOMA OF UTERUS: Status: RESOLVED | Noted: 2023-07-24 | Resolved: 2024-03-10

## 2024-03-10 PROBLEM — N80.9 ENDOMETRIOSIS DETERMINED BY LAPAROSCOPY: Status: RESOLVED | Noted: 2023-12-05 | Resolved: 2024-03-10

## 2024-03-10 PROBLEM — D25.0 INTRAMURAL, SUBMUCOUS, AND SUBSEROUS LEIOMYOMA OF UTERUS: Status: RESOLVED | Noted: 2023-07-24 | Resolved: 2024-03-10

## 2024-03-10 PROBLEM — R50.82 POSTOPERATIVE FEVER: Status: RESOLVED | Noted: 2024-03-06 | Resolved: 2024-03-10

## 2024-03-10 PROBLEM — D25.1 INTRAMURAL, SUBMUCOUS, AND SUBSEROUS LEIOMYOMA OF UTERUS: Status: RESOLVED | Noted: 2023-07-24 | Resolved: 2024-03-10

## 2024-03-10 PROBLEM — N80.129 ENDOMETRIOMA: Status: RESOLVED | Noted: 2023-07-24 | Resolved: 2024-03-10

## 2024-03-10 PROBLEM — N92.0 MENORRHAGIA WITH REGULAR CYCLE: Status: RESOLVED | Noted: 2023-07-24 | Resolved: 2024-03-10

## 2024-03-10 PROBLEM — Z90.710 S/P ABDOMINAL HYSTERECTOMY: Status: RESOLVED | Noted: 2024-02-28 | Resolved: 2024-03-10

## 2024-03-10 PROBLEM — D50.0 CHRONIC BLOOD LOSS ANEMIA: Status: RESOLVED | Noted: 2023-07-24 | Resolved: 2024-03-10

## 2024-03-11 ENCOUNTER — OFFICE VISIT (OUTPATIENT)
Dept: OBGYN CLINIC | Age: 39
End: 2024-03-11

## 2024-03-11 VITALS
SYSTOLIC BLOOD PRESSURE: 120 MMHG | WEIGHT: 172 LBS | DIASTOLIC BLOOD PRESSURE: 70 MMHG | BODY MASS INDEX: 25.48 KG/M2 | HEIGHT: 69 IN

## 2024-03-11 DIAGNOSIS — Z09 POSTOPERATIVE EXAMINATION: Primary | ICD-10-CM

## 2024-03-11 PROCEDURE — 99024 POSTOP FOLLOW-UP VISIT: CPT | Performed by: OBSTETRICS & GYNECOLOGY

## 2024-03-11 NOTE — PROGRESS NOTES
Patient comes in today for 2 week post op visit. Patient would like to return to work next week with light duty. 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     LAST PAP:  07/24/2023, Negative HPV Negative    LAST MAMMO:  Never    LMP:  Patient's last menstrual period was 11/16/2023 (exact date).    BIRTH CONTROL:  status post hysterectomy    TOBACCO USE:  No    FAMILY HISTORY OF:   Breast Cancer:  No   Ovarian Cancer:  No   Uterine Cancer:  No   Colon Cancer:  No    Vitals:    03/11/24 0826   BP: 120/70   Site: Left Upper Arm   Position: Sitting   Weight: 78 kg (172 lb)   Height: 1.753 m (5' 9\")        Genoveva Cardenas MA  03/11/24  8:34 AM

## 2024-03-11 NOTE — PROGRESS NOTES
[facility]  [address], [place] [address]  [phone]    Phoenix Bae MD, FACOG  Kena Myrick [facility]  Cheri Gaston MD, FACOG    2 week Post-Op Note    Ms. Prateek Llamas presents today for 2 week postop check after ERNIE-BS    She is without complaints today.      Denies any F/C, CP, SOB.    Vitals:    03/11/24 0826   BP: 120/70   Site: Left Upper Arm   Position: Sitting   Weight: 78 kg (172 lb)   Height: 1.753 m (5' 9\")          CV - RRR    Lungs - CTA bilat    ABD - soft, approp tend,  incision C/D/I    PATH D/W pt in detail -- \"UTERUS, CERVIX BILATERAL FALLOPIAN TUBES\":        CERVIX  NO DIAGNOSTIC ABNORMALITIES.        ENDOMETRIUM  WEAKLY PROLIFERATIVE.        MYOMETRIUM  LEIOMYOMATA.        FALLOPIAN TUBES  ENDOMETRIOSIS; BENIGN PARATUBAL CYST       Problem List Items Addressed This Visit       Postoperative examination - Primary     Healing well - routine PO instructions               Phoenix Bae MD   8:49 AM  03/11/24

## 2024-03-11 NOTE — PATIENT INSTRUCTIONS
Keep incision clean and dry - use a hair dryer on cool setting.  Please let us know if you develop fever over 101.0, severe abdominal pain, persistent nausea or vomiting, or your incision gets red, hot, swollen or has a foul discharge.  Continue pelvic rest until your next visit.  Thanks for coming to see us today and letting us take care of you!

## 2024-03-20 ENCOUNTER — TELEPHONE (OUTPATIENT)
Dept: OBGYN CLINIC | Age: 39
End: 2024-03-20

## 2024-03-20 NOTE — TELEPHONE ENCOUNTER
Pt lvm stating she is having some spotting and is not sure where it is coming from since she does not have a uterus. Called pt back, pt stated:   The Color is light brown, beige and only when she wipes  Started yesterday    Spotting is very faint, not showing up on a panty liner  Has not lifted anything heavy  Nothing has been inside the vagina   denied fever and chills   Denied urinary problems.   Stated incision site still has some pain   Informed pt that I spoke to Kena and she stated that this can be normal. If the bleeding becomes bright red, heavy or if she develops fever or chills then she will need to go to the hospital. Offered to move pt 6wk post op visit up a week and she declined. Pt stated she just wanted to make sure this was normal since she is 3wks post op. Informed pt to let us know if things get worse. Pt voiced understanding.

## 2024-04-09 PROBLEM — Z09 POSTOPERATIVE EXAMINATION: Status: RESOLVED | Noted: 2023-12-05 | Resolved: 2024-04-09

## 2024-04-11 ENCOUNTER — OFFICE VISIT (OUTPATIENT)
Dept: OBGYN CLINIC | Age: 39
End: 2024-04-11

## 2024-04-11 VITALS
BODY MASS INDEX: 25.77 KG/M2 | HEIGHT: 69 IN | SYSTOLIC BLOOD PRESSURE: 130 MMHG | DIASTOLIC BLOOD PRESSURE: 80 MMHG | WEIGHT: 174 LBS

## 2024-04-11 DIAGNOSIS — Z09 POSTOPERATIVE EXAMINATION: Primary | ICD-10-CM

## 2024-04-11 PROCEDURE — 99024 POSTOP FOLLOW-UP VISIT: CPT | Performed by: OBSTETRICS & GYNECOLOGY

## 2024-04-11 ASSESSMENT — PATIENT HEALTH QUESTIONNAIRE - PHQ9
2. FEELING DOWN, DEPRESSED OR HOPELESS: NOT AT ALL
SUM OF ALL RESPONSES TO PHQ QUESTIONS 1-9: 0
SUM OF ALL RESPONSES TO PHQ9 QUESTIONS 1 & 2: 0
1. LITTLE INTEREST OR PLEASURE IN DOING THINGS: NOT AT ALL

## 2024-04-11 NOTE — PROGRESS NOTES
Chaperone for Intimate Exam     Chaperone was offer accepted as part of the rooming process    Chaperone: Madonna Pearl

## 2024-04-11 NOTE — PATIENT INSTRUCTIONS
Please continue pelvic rest for 2 more weeks  Come back in 6 months for a recheck with ultrasound  Thanks for coming to see us today and letting us take care of you!

## 2024-04-11 NOTE — PROGRESS NOTES
Patient comes in today for 6 week post op visit. Patient states she has been having some spotting.    LAST PAP:  07/24/2023, Negative HPV Negative    LAST MAMMO:  Never    LMP:  Patient's last menstrual period was 11/16/2023 (exact date).    BIRTH CONTROL:  status post hysterectomy    TOBACCO USE:  No    FAMILY HISTORY OF:   Breast Cancer:  No   Ovarian Cancer:  No   Uterine Cancer:  No   Colon Cancer:  No    Vitals:    04/11/24 1123   BP: 130/80   Site: Left Upper Arm   Position: Sitting   Weight: 78.9 kg (174 lb)   Height: 1.753 m (5' 9\")        Genoveva Cardenas MA  04/11/24  11:24 AM

## 2024-04-11 NOTE — PROGRESS NOTES
[facility]  [address], [place] [address]  [phone]    Phoenix Bae MD, FACOG  [facility]ena [facility]  Cheri Gaston MD, FACOG    2 week Post-Op Note    Ms. Prateek Llamas presents today for 2 week postop check after ERNIE-BS    She is without complaints today except some blood tinged D/C.      Denies any F/C, CP, SOB.    Vitals:    04/11/24 1123   BP: 130/80   Site: Left Upper Arm   Position: Sitting   Weight: 78.9 kg (174 lb)   Height: 1.753 m (5' 9\")          CV - RRR    Lungs - CTA bilat    ABD - soft, approp tend,  incision C/D/I    Pelvic - cuff intact, scant pink D/C noted, no masses or tend on bimanaual    The physical exam portion of the visit was chaperoned by female staff member       Problem List Items Addressed This Visit       Postoperative examination - Primary     No further treatment at this time               Phoenix Bae MD   11:55 AM  04/11/24

## 2024-05-11 PROBLEM — Z09 POSTOPERATIVE EXAMINATION: Status: RESOLVED | Noted: 2023-12-05 | Resolved: 2024-05-11

## 2024-11-19 ENCOUNTER — OFFICE VISIT (OUTPATIENT)
Dept: OBGYN CLINIC | Age: 39
End: 2024-11-19
Payer: OTHER GOVERNMENT

## 2024-11-19 ENCOUNTER — PROCEDURE VISIT (OUTPATIENT)
Dept: OBGYN CLINIC | Age: 39
End: 2024-11-19
Payer: OTHER GOVERNMENT

## 2024-11-19 VITALS
DIASTOLIC BLOOD PRESSURE: 80 MMHG | BODY MASS INDEX: 23.55 KG/M2 | HEIGHT: 69 IN | SYSTOLIC BLOOD PRESSURE: 128 MMHG | WEIGHT: 159 LBS

## 2024-11-19 DIAGNOSIS — N80.30 PELVIC PERITONEAL ENDOMETRIOSIS: Primary | ICD-10-CM

## 2024-11-19 DIAGNOSIS — N83.202 CYSTS OF BOTH OVARIES: ICD-10-CM

## 2024-11-19 DIAGNOSIS — N80.129 ENDOMETRIOMA OF OVARY: ICD-10-CM

## 2024-11-19 DIAGNOSIS — N80.129 ENDOMETRIOMA: Primary | ICD-10-CM

## 2024-11-19 DIAGNOSIS — N83.201 CYSTS OF BOTH OVARIES: ICD-10-CM

## 2024-11-19 PROCEDURE — 99214 OFFICE O/P EST MOD 30 MIN: CPT | Performed by: OBSTETRICS & GYNECOLOGY

## 2024-11-19 PROCEDURE — 76856 US EXAM PELVIC COMPLETE: CPT | Performed by: OBSTETRICS & GYNECOLOGY

## 2024-11-19 RX ORDER — KETOROLAC TROMETHAMINE 10 MG/1
TABLET, FILM COATED ORAL
COMMUNITY
Start: 2024-10-31

## 2024-11-19 RX ORDER — LAMOTRIGINE 25 MG/1
TABLET ORAL
COMMUNITY
Start: 2024-11-07

## 2024-11-19 RX ORDER — GABAPENTIN 300 MG/1
300 CAPSULE ORAL 2 TIMES DAILY
COMMUNITY
Start: 2024-10-31

## 2024-11-19 RX ORDER — CYCLOBENZAPRINE HCL 5 MG
TABLET ORAL
COMMUNITY
Start: 2024-10-17

## 2024-11-19 RX ORDER — RELUGOLIX, ESTRADIOL HEMIHYDRATE, AND NORETHINDRONE ACETATE 40; 1; .5 MG/1; MG/1; MG/1
1 TABLET, FILM COATED ORAL DAILY
Qty: 30 TABLET | Refills: 11 | Status: SHIPPED | OUTPATIENT
Start: 2024-11-19

## 2024-11-19 NOTE — PROGRESS NOTES
[facility]  [address], [place] [address]  [phone]    Phoenix Bae MD, FACOG  Kena Myrick [facility]  Cheri Gaston MD, FACOG    Assessment/Plan     Patient Active Problem List    Diagnosis Date Noted    Pelvic peritoneal endometriosis 2024     Overview Note:     Known Hx       Assessment & Plan Note:     See A&P under endometriomas      Endometrioma of ovary 2023     Overview Note:     24:  suspected bilateral (right 3.6 x 2.6 x 3.9 cm and 2.4 x 2.2 x 2.5 cm AND left 2.5 x 1.7 x 1.6 cm)       Assessment & Plan Note:     Us ordered today, reviewed and D/w pt in detail  LENGTHY D/W pt about findings, treatment options, disease progression, etc.  D/W her that she is not currently in pain but I could not tell her if this will return and/or the endometriomas would enlarge causing more adhesions, make surgery riskier, etc  She agree to restart the Myfembree for treatment and follow up in 6 months to recheck         Problem List Items Addressed This Visit       Pelvic peritoneal endometriosis - Primary     See A&P under endometriomas         Endometrioma of ovary     Us ordered today, reviewed and D/w pt in detail  LENGTHY D/W pt about findings, treatment options, disease progression, etc.  D/W her that she is not currently in pain but I could not tell her if this will return and/or the endometriomas would enlarge causing more adhesions, make surgery riskier, etc  She agree to restart the Myfembree for treatment and follow up in 6 months to recheck             Subjective     LAST PAP:  s/p hysterectomy      LAST MAMMO:  never      LMP:  Patient's last menstrual period was 2023 (exact date).     BIRTH CONTROL:  status post hysterectomy     TOBACCO USE:  No    Prateek Gastont 38 y.o.  presents today for US and follow up.  Pt reports no further pain. Denies any vaginal bleeding or D/C, pelvic pain, F/C, assoc GI/ issues.  Denies any neuro changes,

## 2024-11-19 NOTE — PROGRESS NOTES
Pt comes in today for US and follow up visit. Pt states she has a keloid at her scar and would like to know how to get rid of it.     LAST PAP:  s/p hysterectomy     LAST MAMMO:  never     LMP:  Patient's last menstrual period was 11/16/2023 (exact date).    BIRTH CONTROL:  status post hysterectomy    TOBACCO USE:  No    FAMILY HISTORY OF:   Breast Cancer:  No   Ovarian Cancer:  No   Uterine Cancer:  No   Colon Cancer:  No    Vitals:    11/19/24 1404   BP: 128/80   Site: Left Upper Arm   Position: Sitting   Weight: 72.1 kg (159 lb)   Height: 1.753 m (5' 9\")        Leisa Mclain MA  11/19/24  2:11 PM

## 2024-11-19 NOTE — ASSESSMENT & PLAN NOTE
Us ordered today, reviewed and D/w pt in detail  LENGTHY D/W pt about findings, treatment options, disease progression, etc.  D/W her that she is not currently in pain but I could not tell her if this will return and/or the endometriomas would enlarge causing more adhesions, make surgery riskier, etc  She agree to restart the Myfembree for treatment and follow up in 6 months to recheck

## 2024-11-19 NOTE — PATIENT INSTRUCTIONS
Please restart your Myfembree as prescribed and follow up in 6 months for a rechec  Thanks for coming to see us today and letting us take care of you!

## 2024-11-26 ENCOUNTER — APPOINTMENT (RX ONLY)
Dept: URBAN - NONMETROPOLITAN AREA CLINIC 1 | Facility: CLINIC | Age: 39
Setting detail: DERMATOLOGY
End: 2024-11-26

## 2024-11-26 DIAGNOSIS — L91.0 HYPERTROPHIC SCAR: ICD-10-CM | Status: INADEQUATELY CONTROLLED

## 2024-11-26 PROCEDURE — ? PHOTO-DOCUMENTATION

## 2024-11-26 PROCEDURE — ? COUNSELING

## 2024-11-26 PROCEDURE — ? INTRALESIONAL KENALOG

## 2024-11-26 PROCEDURE — 11900 INJECT SKIN LESIONS </W 7: CPT

## 2024-11-26 ASSESSMENT — LOCATION SIMPLE DESCRIPTION DERM: LOCATION SIMPLE: GROIN

## 2024-11-26 ASSESSMENT — LOCATION DETAILED DESCRIPTION DERM: LOCATION DETAILED: LEFT SUPRAPUBIC SKIN

## 2024-11-26 ASSESSMENT — LOCATION ZONE DERM: LOCATION ZONE: TRUNK

## 2025-05-19 ENCOUNTER — OFFICE VISIT (OUTPATIENT)
Dept: OBGYN CLINIC | Age: 40
End: 2025-05-19

## 2025-05-19 VITALS
SYSTOLIC BLOOD PRESSURE: 120 MMHG | DIASTOLIC BLOOD PRESSURE: 78 MMHG | WEIGHT: 158 LBS | BODY MASS INDEX: 23.4 KG/M2 | HEIGHT: 69 IN

## 2025-05-19 DIAGNOSIS — N80.129 ENDOMETRIOMA OF OVARY: Primary | ICD-10-CM

## 2025-05-19 NOTE — PROGRESS NOTES
Patient comes in today for US and follow up visit.     LAST PAP:  s/p hysterectomy     LAST MAMMO:  never     LMP:  Patient's last menstrual period was 11/16/2023 (exact date).    BIRTH CONTROL:  status post hysterectomy    TOBACCO USE:  No    FAMILY HISTORY OF:   Breast Cancer:  No   Ovarian Cancer:  No   Uterine Cancer:  No   Colon Cancer:  No    Vitals:    05/19/25 1328   BP: 120/78   BP Site: Left Upper Arm   Patient Position: Sitting   Weight: 71.7 kg (158 lb)   Height: 1.753 m (5' 9\")        Leisa Mclain MA  05/19/25  1:34 PM

## 2025-05-19 NOTE — PROGRESS NOTES
Pt refuses TVUS.  Bladder empty, given water to drink.  Recheck bladder still empty.  Pt to reschedule

## 2025-05-21 ENCOUNTER — TELEPHONE (OUTPATIENT)
Dept: OBGYN CLINIC | Age: 40
End: 2025-05-21

## 2025-05-21 NOTE — TELEPHONE ENCOUNTER
Dr. Bae,     Patient wants hormones checked due to feeling: lethargy, tiredness, and dizziness with myfembree medication.     Patient states due to these side effects she has been taking myfembree inconsistently since it was prescribed back in 11/2024.     Patient confirms she has been taking it consistently for 2 months now.     Recommendations?

## 2025-05-21 NOTE — TELEPHONE ENCOUNTER
Called patient back and updated her on providers response regarding her request for hormone check due to side effects of myfembree rx.     Patient verbalized understanding and states that she is going to stop myfembree rx since it is significantly effecting her performance in the gym and will f/u with us at her next visit on 6/30/2025.

## 2025-05-21 NOTE — TELEPHONE ENCOUNTER
Her hormone levels will be off due to the myfembree. It works by decreasing estrogen to help treat endometriosis. No indication to check.

## 2025-08-15 ENCOUNTER — PROCEDURE VISIT (OUTPATIENT)
Dept: OBGYN CLINIC | Age: 40
End: 2025-08-15
Payer: OTHER GOVERNMENT

## 2025-08-15 ENCOUNTER — OFFICE VISIT (OUTPATIENT)
Dept: OBGYN CLINIC | Age: 40
End: 2025-08-15
Payer: OTHER GOVERNMENT

## 2025-08-15 VITALS
BODY MASS INDEX: 24.73 KG/M2 | WEIGHT: 167 LBS | HEIGHT: 69 IN | DIASTOLIC BLOOD PRESSURE: 66 MMHG | SYSTOLIC BLOOD PRESSURE: 122 MMHG

## 2025-08-15 DIAGNOSIS — N80.129 ENDOMETRIOMA OF OVARY: Primary | ICD-10-CM

## 2025-08-15 DIAGNOSIS — N83.201 CYSTS OF BOTH OVARIES: ICD-10-CM

## 2025-08-15 DIAGNOSIS — R10.2 PELVIC PAIN: ICD-10-CM

## 2025-08-15 DIAGNOSIS — N83.202 CYSTS OF BOTH OVARIES: ICD-10-CM

## 2025-08-15 PROCEDURE — 76856 US EXAM PELVIC COMPLETE: CPT | Performed by: OBSTETRICS & GYNECOLOGY

## 2025-08-15 PROCEDURE — 99214 OFFICE O/P EST MOD 30 MIN: CPT | Performed by: OBSTETRICS & GYNECOLOGY

## 2025-08-15 RX ORDER — IBUPROFEN 600 MG/1
600 TABLET, FILM COATED ORAL EVERY 6 HOURS PRN
COMMUNITY

## 2025-08-28 ENCOUNTER — TRANSCRIBE ORDERS (OUTPATIENT)
Dept: SCHEDULING | Age: 40
End: 2025-08-28

## 2025-08-28 DIAGNOSIS — M54.51 VERTEBROGENIC LOW BACK PAIN: Primary | ICD-10-CM

## (undated) DEVICE — WOUND RETRACTOR AND PROTECTOR: Brand: ALEXIS O WOUND PROTECTOR-RETRACTOR

## (undated) DEVICE — SOLUTION SCRB 0.04 CHG DYNA HEX

## (undated) DEVICE — GARMENT,MEDLINE,DVT,INT,CALF,LG, GEN2: Brand: MEDLINE

## (undated) DEVICE — SUTURE ABSORBABLE BRAIDED 0 CT-1 8X27 IN UD VICRYL JJ41G

## (undated) DEVICE — NEEDLE HYPO 23GA L1IN TURQ S STL HUB POLYPR SHLD REG BVL

## (undated) DEVICE — 3M™ TEGADERM™ TRANSPARENT FILM DRESSING FRAME STYLE, 1624W, 2-3/8 IN X 2-3/4 IN (6 CM X 7 CM), 100/CT 4CT/CASE: Brand: 3M™ TEGADERM™

## (undated) DEVICE — GLOVE SURG SZ 8 CRM LTX FREE POLYISOPRENE POLYMER BEAD ANTI

## (undated) DEVICE — AGENT HEMSTAT 3GM OXIDIZED REGENERATED CELOS ABSRB FOR CONT (ORDER MULTIPLES OF 5EA)

## (undated) DEVICE — SUTURE MCRYL SZ 4-0 L18IN ABSRB UD L19MM PS-2 3/8 CIR PRIM Y496G

## (undated) DEVICE — SOLUTION IRRIG 1000ML 0.9% SOD CHL USP POUR PLAS BTL

## (undated) DEVICE — SOLUTION IRRIG 1000ML STRL H2O USP PLAS POUR BTL

## (undated) DEVICE — SYRINGE MED 10ML LUERLOCK TIP W/O SFTY DISP

## (undated) DEVICE — GLOVE ORANGE PI 7 1/2   MSG9075

## (undated) DEVICE — ARM DRAPE

## (undated) DEVICE — STAPLER SKIN SQ 30 ABSRB STPL DISP INSORB ORDER VIA PHONE OR EMAIL

## (undated) DEVICE — COLUMN DRAPE

## (undated) DEVICE — STERILE SLEEVE: Brand: CONVERTORS

## (undated) DEVICE — SOLUTION IV 1000ML LAC RINGERS PH 6.5 INJ USP VIAFLX PLAS

## (undated) DEVICE — PAD PT POS 36 IN SURGYPAD DISP

## (undated) DEVICE — INTENDED FOR TISSUE SEPARATION, AND OTHER PROCEDURES THAT REQUIRE A SHARP SURGICAL BLADE TO PUNCTURE OR CUT.: Brand: BARD-PARKER ® STAINLESS STEEL BLADES

## (undated) DEVICE — SYRINGE MED 30ML STD CLR PLAS LUERLOCK TIP N CTRL DISP

## (undated) DEVICE — JELLY LUBRICATING 10GM PREFIL SYR LUBE

## (undated) DEVICE — CANISTER, RIGID, 2000CC: Brand: MEDLINE INDUSTRIES, INC.

## (undated) DEVICE — SPONGE LAPAROTOMY W18XL18IN WHITE STRUNG RADIOPAQUE STERILE

## (undated) DEVICE — CATHETER URETH 16FR BLLN 5CC SIL ALLY W/ SIL HYDRGEL 2 W F

## (undated) DEVICE — VESSEL SEALER EXTEND: Brand: ENDOWRIST

## (undated) DEVICE — APPLICATOR  COTTON-TIPPED 6 IN WOOD STRL

## (undated) DEVICE — SEALER ENDOSCP NANO COAT OPN DIV CRV L JAW LIGASURE IMPACT

## (undated) DEVICE — GAUZE,SPONGE,2"X2",8PLY,STERILE,LF,2'S: Brand: MEDLINE

## (undated) DEVICE — SYRINGE IRRIG 60ML SFT PLIABLE BLB EZ TO GRP 1 HND USE W/

## (undated) DEVICE — GYN LAPAROSCOPY: Brand: MEDLINE INDUSTRIES, INC.

## (undated) DEVICE — Z DISC USE 2764362 SEAL ENDOSCP INSTR DIA5-8MM UNIV FOR CANN DA VINCI XI

## (undated) DEVICE — TUBING INSUFFLATION SMK EVAC HI FLO SET PNEUMOCLEAR

## (undated) DEVICE — BLADELESS OBTURATOR: Brand: WECK VISTA

## (undated) DEVICE — SUTURE MCRYL SZ 4-0 L27IN ABSRB UD L19MM PS-2 1/2 CIR PRIM Y426H

## (undated) DEVICE — DRAPE TWL SURG 16X26IN BLU ORB04] ALLCARE INC]

## (undated) DEVICE — TUBING, SUCTION, 1/4" X 10', STRAIGHT: Brand: MEDLINE

## (undated) DEVICE — SKIN PREP TRAY 4 COMPARTM TRAY: Brand: MEDLINE INDUSTRIES, INC.

## (undated) DEVICE — TROCAR: Brand: KII® SLEEVE

## (undated) DEVICE — BARRIER ADH W3XL4IN UTER PELV ABSRB GYNECARE INTCEED

## (undated) DEVICE — SURGICEL ENDOSCP APPL

## (undated) DEVICE — ROBOTIC HYSTERECTOMY: Brand: MEDLINE INDUSTRIES, INC.

## (undated) DEVICE — VCARE LARGE UTERINE MANIPULATOR: Brand: VCARE LARGE

## (undated) DEVICE — SUTURE V-LOC 180 SZ 0 L9IN ABSRB GRN GS-21 L37MM 1/2 CIR VLOCL0346

## (undated) DEVICE — APPLICATOR MEDICATED 26 CC SOLUTION HI LT ORNG CHLORAPREP

## (undated) DEVICE — TIP COVER ACCESSORY

## (undated) DEVICE — AIRSEAL 5 MM ACCESS PORT AND LOW PROFILE OBTURATOR WITH BLADELESS OPTICAL TIP, 120 MM LENGTH: Brand: AIRSEAL

## (undated) DEVICE — INJECTOR UTERINE MANIPULATOR

## (undated) DEVICE — YANKAUER,BULB TIP,W/O VENT,RIGID,STERILE: Brand: MEDLINE

## (undated) DEVICE — INSUFFLATION NEEDLE TO ESTABLISH PNEUMOPERITONEUM.: Brand: INSUFFLATION NEEDLE

## (undated) DEVICE — PUMP SUC IRR TBNG L10FT W/ HNDPC ASSEMB STRYKEFLOW 2

## (undated) DEVICE — TROCAR: Brand: KII FIOS FIRST ENTRY

## (undated) DEVICE — SUTURE VCRL SZ 0 L36IN ABSRB UD L36MM CT-1 1/2 CIR J946H

## (undated) DEVICE — CATHETER URETH 16FR BLLN 5CC STD LTX 2 W F TWO OPP DRNGE

## (undated) DEVICE — ROBOTIC DRAPE WITH LEGGINGS: Brand: CONVERTORS